# Patient Record
Sex: MALE | Race: WHITE
[De-identification: names, ages, dates, MRNs, and addresses within clinical notes are randomized per-mention and may not be internally consistent; named-entity substitution may affect disease eponyms.]

---

## 2018-04-01 ENCOUNTER — HOSPITAL ENCOUNTER (INPATIENT)
Dept: HOSPITAL 92 - ERS | Age: 73
LOS: 4 days | Discharge: HOME | DRG: 189 | End: 2018-04-05
Attending: INTERNAL MEDICINE | Admitting: INTERNAL MEDICINE
Payer: MEDICARE

## 2018-04-01 VITALS — BODY MASS INDEX: 24.1 KG/M2

## 2018-04-01 DIAGNOSIS — I10: ICD-10-CM

## 2018-04-01 DIAGNOSIS — J96.21: Primary | ICD-10-CM

## 2018-04-01 DIAGNOSIS — Z87.891: ICD-10-CM

## 2018-04-01 DIAGNOSIS — E78.5: ICD-10-CM

## 2018-04-01 DIAGNOSIS — Z95.5: ICD-10-CM

## 2018-04-01 DIAGNOSIS — E87.1: ICD-10-CM

## 2018-04-01 DIAGNOSIS — N40.0: ICD-10-CM

## 2018-04-01 DIAGNOSIS — Z99.81: ICD-10-CM

## 2018-04-01 DIAGNOSIS — I25.10: ICD-10-CM

## 2018-04-01 DIAGNOSIS — E87.6: ICD-10-CM

## 2018-04-01 DIAGNOSIS — J06.9: ICD-10-CM

## 2018-04-01 DIAGNOSIS — J44.1: ICD-10-CM

## 2018-04-01 LAB
ALBUMIN SERPL BCG-MCNC: 3.6 G/DL (ref 3.4–4.8)
ALP SERPL-CCNC: 73 U/L (ref 40–150)
ALT SERPL W P-5'-P-CCNC: 20 U/L (ref 8–55)
ANION GAP SERPL CALC-SCNC: 15 MMOL/L (ref 10–20)
AST SERPL-CCNC: 30 U/L (ref 5–34)
BASOPHILS # BLD AUTO: 0 THOU/UL (ref 0–0.2)
BASOPHILS NFR BLD AUTO: 0.1 % (ref 0–1)
BILIRUB SERPL-MCNC: 0.5 MG/DL (ref 0.2–1.2)
BUN SERPL-MCNC: 19 MG/DL (ref 8.4–25.7)
CALCIUM SERPL-MCNC: 9.2 MG/DL (ref 7.8–10.44)
CHLORIDE SERPL-SCNC: 94 MMOL/L (ref 98–107)
CK MB SERPL-MCNC: 3.3 NG/ML (ref 0–6.6)
CK SERPL-CCNC: 133 U/L (ref 30–200)
CO2 SERPL-SCNC: 26 MMOL/L (ref 23–31)
CREAT CL PREDICTED SERPL C-G-VRATE: 0 ML/MIN (ref 70–130)
CRYSTAL-AUWI FLAG: 0 (ref 0–15)
EOSINOPHIL # BLD AUTO: 0 THOU/UL (ref 0–0.7)
EOSINOPHIL NFR BLD AUTO: 0.2 % (ref 0–10)
GLOBULIN SER CALC-MCNC: 3.8 G/DL (ref 2.4–3.5)
GLUCOSE SERPL-MCNC: 126 MG/DL (ref 83–110)
HEV IGM SER QL: 2.1 (ref 0–7.99)
HGB BLD-MCNC: 12.5 G/DL (ref 14–18)
HYALINE CASTS #/AREA URNS LPF: (no result) LPF
LYMPHOCYTES # BLD: 0.8 THOU/UL (ref 1.2–3.4)
LYMPHOCYTES NFR BLD AUTO: 4.5 % (ref 21–51)
MCH RBC QN AUTO: 30.1 PG (ref 27–31)
MCV RBC AUTO: 92.1 FL (ref 80–94)
MONOCYTES # BLD AUTO: 1.6 THOU/UL (ref 0.11–0.59)
MONOCYTES NFR BLD AUTO: 9.4 % (ref 0–10)
NEUTROPHILS # BLD AUTO: 14.8 THOU/UL (ref 1.4–6.5)
NEUTROPHILS NFR BLD AUTO: 85.8 % (ref 42–75)
PATHC CAST-AUWI FLAG: 0 (ref 0–2.49)
PLATELET # BLD AUTO: 347 THOU/UL (ref 130–400)
POTASSIUM SERPL-SCNC: 4.7 MMOL/L (ref 3.5–5.1)
RBC # BLD AUTO: 4.16 MILL/UL (ref 4.7–6.1)
RBC UR QL AUTO: (no result) HPF (ref 0–3)
SODIUM SERPL-SCNC: 130 MMOL/L (ref 136–145)
SP GR UR STRIP: 1.01 (ref 1–1.04)
SPERM-AUWI FLAG: 0 (ref 0–9.9)
TROPONIN I SERPL DL<=0.01 NG/ML-MCNC: 0.02 NG/ML (ref ?–0.03)
TROPONIN I SERPL DL<=0.01 NG/ML-MCNC: 0.03 NG/ML (ref ?–0.03)
TROPONIN I SERPL DL<=0.01 NG/ML-MCNC: 0.03 NG/ML (ref ?–0.03)
WBC # BLD AUTO: 17.2 THOU/UL (ref 4.8–10.8)
WBC UR QL AUTO: (no result) HPF (ref 0–3)
YEAST-AUWI FLAG: 0 (ref 0–25)

## 2018-04-01 PROCEDURE — 96361 HYDRATE IV INFUSION ADD-ON: CPT

## 2018-04-01 PROCEDURE — 87633 RESP VIRUS 12-25 TARGETS: CPT

## 2018-04-01 PROCEDURE — 96366 THER/PROPH/DIAG IV INF ADDON: CPT

## 2018-04-01 PROCEDURE — 36415 COLL VENOUS BLD VENIPUNCTURE: CPT

## 2018-04-01 PROCEDURE — 96365 THER/PROPH/DIAG IV INF INIT: CPT

## 2018-04-01 PROCEDURE — 84484 ASSAY OF TROPONIN QUANT: CPT

## 2018-04-01 PROCEDURE — 83880 ASSAY OF NATRIURETIC PEPTIDE: CPT

## 2018-04-01 PROCEDURE — 96375 TX/PRO/DX INJ NEW DRUG ADDON: CPT

## 2018-04-01 PROCEDURE — 71045 X-RAY EXAM CHEST 1 VIEW: CPT

## 2018-04-01 PROCEDURE — A4216 STERILE WATER/SALINE, 10 ML: HCPCS

## 2018-04-01 PROCEDURE — 80048 BASIC METABOLIC PNL TOTAL CA: CPT

## 2018-04-01 PROCEDURE — 80053 COMPREHEN METABOLIC PANEL: CPT

## 2018-04-01 PROCEDURE — 94640 AIRWAY INHALATION TREATMENT: CPT

## 2018-04-01 PROCEDURE — 82553 CREATINE MB FRACTION: CPT

## 2018-04-01 PROCEDURE — 93005 ELECTROCARDIOGRAM TRACING: CPT

## 2018-04-01 PROCEDURE — 85025 COMPLETE CBC W/AUTO DIFF WBC: CPT

## 2018-04-01 PROCEDURE — 81001 URINALYSIS AUTO W/SCOPE: CPT

## 2018-04-01 PROCEDURE — 87798 DETECT AGENT NOS DNA AMP: CPT

## 2018-04-01 RX ADMIN — MOMETASONE FUROATE AND FORMOTEROL FUMARATE DIHYDRATE SCH PUFF: 200; 5 AEROSOL RESPIRATORY (INHALATION) at 18:50

## 2018-04-01 NOTE — HP
PRIMARY CARE PHYSICIAN:  Dr. Samson Brown.

 

PRIMARY PULMONOLOGIST:  Dr. Osborne.

 

REASON FOR ADMISSION:  COPD exacerbation.

 

HISTORY OF PRESENT ILLNESS:  A 73-year-old male who has underlying history of 
COPD who presented to emergency room with the complaint of increasing shortness 
of breath.  Patient reports that he is sick for the last 10 days.  He was 
having increasing shortness of breath, cough, and upper respiratory symptoms.  
Patient reports that at this time, he was exposed to pollen and after that 
allergy started.  He was using Claritin and Benadryl p.r.n. basis.  He was 
using his inhaler and nebulizer therapy at home, but symptoms were not 
improving.  Day by day, he was getting worse.  He was having fever of up to T-
maximum 101 at one time, but most of the days, his fever was running in 100 to 
99.  The patient was not able to get deep breath in and he was feeling more and 
more short of breath.  Symptoms were getting more and more worse during daytime 
as well as nighttime and that is why he decided to come to emergency room for 
evaluation.

 

Patient reports that he is normally able to ambulate without any getting 
shortness of breath, but for last 5 days he is having difficulty ambulation 
because of shortness of breath.  He denies any hemoptysis.  He denies any lower 
extremity edema.  He denies any calf tenderness.  He denies any chest pain.  He 
denies any hemoptysis.  He denies any nausea, vomiting, diarrhea, UTI symptoms.
  He denies any abdominal pain, hematochezia, or melena.

 

REVIEW OF SYSTEMS:  The following complete review of systems was negative, 
unless otherwise mentioned in the HPI or below:

Constitutional:  Weight loss or gain, ability to conduct usual activities.

Skin:  Rash, itching.

Eyes:  Double vision, pain.

ENT/Mouth:  Nose bleeding, neck stiffness, pain, tenderness.

Cardiovascular:  Palpitations, dyspnea on exertion, orthopnea.

Respiratory:  Shortness of breath, wheezing, cough, hemoptysis, fever or night 
sweats.

Gastrointestinal:  Poor appetite, abdominal pain, heartburn, nausea, vomiting, 
constipation, or diarrhea.

Genitourinary:  Urgency, frequency, dysuria, nocturia.

Musculoskeletal:  Pain, swelling.

Neurologic/Psychiatric:  Anxiety, depression.

Allergy/Immunologic:  Skin rash, bleeding tendency.

 

Please see my HPI for pertinent positive and negative.  All other review of 
systems reviewed and negative except as mentioned in the HPI.

 

ALLERGIES:  No known drug allergy.

 

CURRENT HOME MEDICATIONS:  ProAir HFA 2 puffs q.6 hourly p.r.n., aspirin 81 mg 
p.o. daily, Pulmicort nebulization twice daily, enalapril with 
hydrochlorothiazide 10/25 one tablet p.o. daily, formoterol 20 mcg nebulization 
b.i.d., DuoNeb q.6 hourly, Crestor 20 mg p.o. at bedtime, Flomax 0.4 mg p.o. 
daily.

 

PAST MEDICAL HISTORY:  Hypertension, COPD, chronic respiratory failure using 
oxygen on as needed basis, hypertension, colon cancer, history of hemorrhoid 
and coronary artery disease.

 

PAST SURGICAL HISTORY:  Cardiac catheterization with stent placement, colon 
cancer removed, hernia repair, bilateral cataract surgery.

 

PAST PSYCHIATRIC HISTORY:  Reviewed and negative.

 

SOCIAL HISTORY:  Patient is .  He is a former smoker.  He quit smoking 
several years ago.  He denies any alcohol abuse.  He denies any other illicit 
drug abuse.

 

FAMILY HISTORY:  Negative for GI malignancy.  Coronary artery disease runs 
among several family members.  No family history of stroke.

 

EMERGENCY ROOM COURSE:  Patient is given IV fluid, Solu-Medrol 125 mg, Levaquin 
750 mg, DuoNeb therapy.

 

PHYSICAL EXAMINATION:

VITAL SIGNS:  On arrival, blood pressure 159/66, pulse 104, respiratory rate 25
, temperature 99.1, saturation 99% and weight 72.5 kilograms.

GENERAL:  The patient is currently alert, awake, no obvious acute distress.

HEAD:  Normocephalic, atraumatic.

EYES:  Pupils round, reactive to light.  Extraocular muscle intact.

ENT:  Oropharynx within normal limits.  Moist mucous membranes.  No oral lesion
, no pharyngeal erythema, no exudate.

NECK:  Supple, no JVD, no thyromegaly, no carotid bruit, no meningeal signs of 
irritation.

LUNGS:  End expiratory wheezing heard.  No accessory muscles of respiration in 
use.  Air entry reduced on both sides.

CARDIAC:  S1 and S2 regular.  No murmur, no gallop, no rub.

ABDOMEN:  Soft, bowel sounds present, nontender, nondistended.  No organomegaly
, no mass, no suprapubic tenderness.

BACK:  Examination unremarkable, no CVA tenderness.

EXTREMITIES:  Upper extremity passive movements of all joints are normal.  
Lower extremities:  No edema.  Good peripheral pulsation.

SKIN:  No skin rash.

HEMATOLOGICAL SYSTEM:  No lymphadenopathy.

PSYCHIATRIC:  Normal affect.

 

IMAGING DATA AND SIGNIFICANT LABORATORY DATA:

1.  EKG showing sinus tachycardia, incomplete right bundle branch block pattern
, nonspecific ST-T changes.

2.  Chest x-ray based on my review, chronic lung changes, COPD type of changes, 
but no acute process.

3.  CBC:  WBC is 17.2, hemoglobin 12.5, platelets 347 with left shift.

4.  BMP:  Sodium 130, potassium 4.7, chloride 94, carbon dioxide 26, BUN 19, 
creatinine 1.11, glucose 126, calcium 9.2.

5.  LFT:  AST 30, ALT 20, alkaline phosphatase 73, albumin is 3.6.  , CK-
MB 3.3, troponin I 0.028, BNP 33.0.

 

ASSESSMENT AND PLAN/IMPRESSION:

1.  Acute on chronic obstructive pulmonary disease exacerbation.  The patient's 
chronic obstructive pulmonary disease flareup is caused by allergy/upper 
respiratory infection.  At this point, patient will be admitted to telemetry 
floor.  We will continue DuoNeb q.4 hourly and p.r.n. basis Solu-Medrol 40 mg 
IV q.6 hourly, Pulmicort nebulization twice daily and Dulera two puffs 
inhalation b.i.d.  We will also continue empiric antibiotic therapy with 
Levaquin 750 mg IV daily and Mucinex 600 mg 3 times daily.  We will also 
provide symptomatic treatment.  We will also continue oxygen to maintain 
saturation above 92%.

2.  Acute/chronic respiratory failure.  We will continue oxygen to maintain 
saturation above 92%.

3.  Dyslipidemia.  Continue Crestor 20 mg p.o. at bedtime.

4.  Benign enlargement of prostate.  Continue Flomax 0.4 mg p.o. daily.

5.  Hypertension.  Continue enalapril 10 mg daily and hydrochlorothiazide 25 mg 
p.o. daily.

6.  Hyponatremia likely related with hydrochlorothiazide versus chronic lung 
disease.  We will repeat BMP tomorrow.

7.  Leukocytosis, likely related with stress versus underlying infection.  The 
patient is already on antibiotic therapy and we will repeat CBC tomorrow.

8.  Deep venous thrombosis prophylaxis, Lovenox 40 mg subcu daily.

9.  Gastrointestinal prophylaxis, Protonix 40 mg p.o. daily.

 

CODE STATUS:  The patient is FULL CODE.  The patient's wife is surrogate 
decision maker.

 

Disposition plan based on clinical course.  We are expecting patient's stay in 
hospital more than 2 midnights.  Plan of care discussed with the patient in 
detail.

 

ESTEFANÍAD

## 2018-04-01 NOTE — RAD
CHEST 1 VIEW:

 

HISTORY: 

Dyspnea.

 

COMPARISON: 

Chest radiograph 11/18/16.

 

FINDINGS: 

Chronic-appearing markings in the lung bases.  No pneumothorax.  No acute osseous abnormality.

 

Cardiac silhouette and mediastinal contours are within normal limits.

 

IMPRESSION: 

Chronic lung changes.  No acute intrathoracic abnormality.

 

POS: [facility]

## 2018-04-02 LAB
ANION GAP SERPL CALC-SCNC: 9 MMOL/L (ref 10–20)
BASOPHILS # BLD AUTO: 0 THOU/UL (ref 0–0.2)
BASOPHILS NFR BLD AUTO: 0 % (ref 0–1)
BUN SERPL-MCNC: 21 MG/DL (ref 8.4–25.7)
CALCIUM SERPL-MCNC: 8.7 MG/DL (ref 7.8–10.44)
CHLORIDE SERPL-SCNC: 98 MMOL/L (ref 98–107)
CO2 SERPL-SCNC: 27 MMOL/L (ref 23–31)
CREAT CL PREDICTED SERPL C-G-VRATE: 66 ML/MIN (ref 70–130)
EOSINOPHIL # BLD AUTO: 0 THOU/UL (ref 0–0.7)
EOSINOPHIL NFR BLD AUTO: 0.2 % (ref 0–10)
GLUCOSE SERPL-MCNC: 159 MG/DL (ref 83–110)
HGB BLD-MCNC: 10.7 G/DL (ref 14–18)
LYMPHOCYTES # BLD: 0.6 THOU/UL (ref 1.2–3.4)
LYMPHOCYTES NFR BLD AUTO: 6.5 % (ref 21–51)
MCH RBC QN AUTO: 31.3 PG (ref 27–31)
MCV RBC AUTO: 91.6 FL (ref 80–94)
MONOCYTES # BLD AUTO: 0.4 THOU/UL (ref 0.11–0.59)
MONOCYTES NFR BLD AUTO: 4.2 % (ref 0–10)
NEUTROPHILS # BLD AUTO: 8.6 THOU/UL (ref 1.4–6.5)
NEUTROPHILS NFR BLD AUTO: 89.2 % (ref 42–75)
PLATELET # BLD AUTO: 332 THOU/UL (ref 130–400)
POTASSIUM SERPL-SCNC: 3.4 MMOL/L (ref 3.5–5.1)
RBC # BLD AUTO: 3.42 MILL/UL (ref 4.7–6.1)
SODIUM SERPL-SCNC: 131 MMOL/L (ref 136–145)
WBC # BLD AUTO: 9.6 THOU/UL (ref 4.8–10.8)

## 2018-04-02 RX ADMIN — MOMETASONE FUROATE AND FORMOTEROL FUMARATE DIHYDRATE SCH PUFF: 200; 5 AEROSOL RESPIRATORY (INHALATION) at 07:40

## 2018-04-02 RX ADMIN — MOMETASONE FUROATE AND FORMOTEROL FUMARATE DIHYDRATE SCH PUFF: 200; 5 AEROSOL RESPIRATORY (INHALATION) at 19:10

## 2018-04-02 NOTE — PDOC.PN
- Subjective


Encounter Start Date: 04/02/18


Encounter Start Time: 07:00


-: old records requested/rev





Pt has cough, dyspnea, slight better but still not normal for him, no fever





- Objective


Resuscitation Status: 


 











Resuscitation Status           FULL:Full Resuscitation














MAR Reviewed: Yes


Vital Signs & Weight: 


 Vital Signs (12 hours)











  Temp Pulse Resp BP BP Pulse Ox


 


 04/02/18 08:08     107/56 L  


 


 04/02/18 07:40   105 H  20    91 L


 


 04/02/18 07:37   105 H  20    91 L


 


 04/02/18 04:00  99 F  61  16   112/47 L  91 L


 


 04/02/18 02:57   88  12   


 


 04/01/18 23:00   88  12   








 Weight











Weight                         163 lb 8 oz














I&O: 


 











 04/01/18 04/02/18 04/03/18





 06:59 06:59 06:59


 


Intake Total  240 


 


Output Total  100 


 


Balance  140 











Result Diagrams: 


 04/02/18 05:24





 04/02/18 05:24





Phys Exam





- Physical Examination


Constitutional: NAD


HEENT: PERRLA, moist MMs, sclera anicteric


Neck: no JVD, supple


Respiratory: wheezing present


reduced air entry


Cardiovascular: RRR, no significant murmur, no rub


Gastrointestinal: soft, non-tender, no distention, positive bowel sounds


Musculoskeletal: no edema, pulses present


Neurological: non-focal, normal sensation, moves all 4 limbs


Lymphatic: no nodes


Psychiatric: normal affect, A&O x 3


Skin: no rash, normal turgor





Dx/Plan


(1) COPD exacerbation


Code(s): J44.1 - CHRONIC OBSTRUCTIVE PULMONARY DISEASE W (ACUTE) EXACERBATION   

Status: Acute   





(2) Hypokalemia


Code(s): E87.6 - HYPOKALEMIA   Status: Acute   





(3) Hyponatremia


Code(s): E87.1 - HYPO-OSMOLALITY AND HYPONATREMIA   Status: Acute   





(4) Leucocytosis


Code(s): D72.829 - ELEVATED WHITE BLOOD CELL COUNT, UNSPECIFIED   Status: Acute

   





(5) BPH (benign prostatic hyperplasia)


Code(s): N40.0 - BENIGN PROSTATIC HYPERPLASIA WITHOUT LOWER URINRY TRACT SYMP   

Status: Chronic   





(6) CAD (coronary artery disease)


Code(s): I25.10 - ATHSCL HEART DISEASE OF NATIVE CORONARY ARTERY W/O ANG PCTRS 

  Status: Chronic   





(7) Hypertension


Code(s): I10 - ESSENTIAL (PRIMARY) HYPERTENSION   Status: Chronic   





- Plan


cont current plan of care, continue antibiotics, respiratory therapy





* continue duoneb, pulmicort, solumedrol, levaquin, mucinex, 


* replace potassium


* pulmonary consulted


* medication reviewed as below


* symptomatic treatment


* home medication reconciled


* monitor oxygen level .








Review of Systems





- Review of Systems


Constitutional: negative: fever, chills, sweats, weakness, malaise, other


Eyes: negative: Pain, Vision Change, Conjunctivae Inflammation, Eyelid 

Inflammation, Redness, Other


Respiratory: Cough, Shortness of Breath, SOB with Excertion, Wheezing.  negative

: Dry, Hemoptysis, Pleuritic Pain, Sputum


Cardiovascular: negative: chest pain, palpitations, orthopnea, paroxysmal 

nocturnal dyspnea, edema, light headedness, other


Gastrointestinal: negative: Nausea, Vomiting, Abdominal Pain, Diarrhea, 

Constipation, Melena, Hematochezia, Other


Genitourinary: negative: Dysuria, Frequency, Incontinence, Hematuria, Retention

, Other


Musculoskeletal: negative: Neck Pain, Shoulder Pain, Arm Pain, Back Pain, Hand 

Pain, Leg Pain, Foot Pain, Other


Skin: negative: Rash, Lesions, [place], Bruising, Other





- Medications/Allergies


Allergies/Adverse Reactions: 


 Allergies











Allergy/AdvReac Type Severity Reaction Status Date / Time


 


No Known Drug Allergies Allergy   Verified 04/01/18 17:49











Medications: 


 Current Medications





Acetaminophen (Tylenol)  650 mg PO Q4H PRN


   PRN Reason: Headache/Fever or Pain


Hydrocodone Bitart/Acetaminophen (Norco 5/325)  1 tab PO Q4H PRN


   PRN Reason: Moderate Pain (4-6)


Al Hydroxide/Mg Hydroxide (Maalox)  30 ml PO Q6H PRN


   PRN Reason: Heartburn  or Indigestion


Albuterol/Ipratropium (Duoneb)  3 ml NEB [id number] [facility]


   Last Admin: 04/02/18 07:37 Dose:  3 ml


Albuterol/Ipratropium (Duoneb)  3 ml NEB [id number] PRN


   PRN Reason: SOB &/or Wheezing


Artificial Tears (Tears Naturale)  0 drop EA EYE PRN PRN


   PRN Reason: Dry Eyes


Aspirin (Aspirin Chewable)  81 mg PO DAILY [facility]


   Last Admin: 04/02/18 08:01 Dose:  81 mg


Budesonide (Pulmicort Neb Solution)  0.5 mg INH BID-RT [facility]


   Last Admin: 04/02/18 07:37 Dose:  0.5 mg


Clonidine (Catapres)  0.1 mg PO Q4H PRN


   PRN Reason: Systolic BP > 180


Enalapril Maleate (Vasotec)  10 mg PO DAILY [facility]


Enoxaparin Sodium (Lovenox)  40 mg SC 0900 [facility]


   Last Admin: 04/02/18 08:09 Dose:  Not Given


Finasteride (Proscar)  5 mg PO DAILY [facility]


Guaifenesin (Mucinex)  600 mg PO TID [facility]


   Last Admin: 04/02/18 08:01 Dose:  600 mg


Hydralazine HCl (Apresoline)  10 mg SLOW IVP Q4H PRN


   PRN Reason: Systolic BP > 180


Hydrochlorothiazide (Hydrochlorothiazide)  12.5 mg PO DAILY [facility]


   Last Admin: 04/02/18 08:09 Dose:  Not Given


Levofloxacin 750 mg/ Device  150 mls @ 100 mls/hr IVPB 1200 [facility]


Loperamide HCl (Imodium)  2 mg PO PRN PRN


   PRN Reason: Diarrhea/Loose Stools


Loratadine (Claritin)  10 mg PO DAILYPRN PRN


   PRN Reason: Sinus Symptoms


Magnesium Hydroxide (Milk Of Magnesium)  30 ml PO DAILYPRN PRN


   PRN Reason: Constipation


Methylprednisolone Sodium Succinate (Solu-Medrol)  40 mg IVP Q6HR [facility]


   Last Admin: 04/02/18 05:18 Dose:  40 mg


Mineral Oil/White Petrolatum (Eucerin Cream)  0 gm TOP BIDPRN PRN


   PRN Reason: Dry Skin


Mometasone Furoate/Formoterol Fumar (Dulera 200 Mcg/5 Mcg Inhaler)  2 puff INH 

BID-RT [facility]


   Last Admin: 04/02/18 07:40 Dose:  2 puff


Ondansetron HCl (Zofran Odt)  4 mg PO Q6H PRN


   PRN Reason: Nausea/Vomiting


Ondansetron HCl (Zofran)  4 mg IVP Q6H PRN


   PRN Reason: Nausea/Vomiting


Pantoprazole Sodium (Protonix)  40 mg PO DAILY [facility]


   Last Admin: 04/02/18 08:02 Dose:  40 mg


Phenol (Chloraseptic Spray 180 Ml Bot)  0 ml PO PRN PRN


   PRN Reason: Sore Throat


Rosuvastatin Calcium (Crestor)  20 mg PO [facility]


   Last Admin: 04/01/18 20:10 Dose:  Not Given


Senna (Senokot)  2 tab PO HSPRN PRN


   PRN Reason: Constipation


Sodium Chloride (Ocean Nasal Spray 0.65%)  0 ml EA NARE QIDPRN PRN


   PRN Reason: Nasal Congestion


Tamsulosin HCl (Flomax)  0.4 mg PO DAILY [facility]


   Last Admin: 04/02/18 08:01 Dose:  0.4 mg


Zolpidem Tartrate (Ambien)  5 mg PO HSPRN PRN


   PRN Reason: Insomnia

## 2018-04-03 RX ADMIN — MOMETASONE FUROATE AND FORMOTEROL FUMARATE DIHYDRATE SCH PUFF: 200; 5 AEROSOL RESPIRATORY (INHALATION) at 06:17

## 2018-04-03 NOTE — CON
DATE OF CONSULTATION:  04/02/2018

 

SERVICE:  Pulmonary Medicine.

 

REASON FOR CONSULTATION:  Chronic obstructive pulmonary disease exacerbation.

 

HISTORY OF PRESENT ILLNESS:  The patient is a 73-year-old white male with 
profound COPD.  He was in his usual state of health when he had onset of 
increasing congestion in the face.  Ultimately after a period of about a week, 
it went down into his chest.  He started having increasing dyspnea on exertion 
and got to the point where he could barely make it across the room.  He 
presented to the emergency department and was subsequently admitted for COPD 
exacerbation.  He denies any current fevers, chills, nausea or vomiting.  He 
did have some low grade fever.  He has not had any night sweats.  He is not 
coughing up any blood, but the character of his sputum is increased, and 
changed to green.

 

PHYSICAL EXAMINATION:

VITAL SIGNS:  Afebrile, pulse 83, blood pressure 107/56, respirations 18, 
saturation 91% on 2 liters nasal cannula.

GENERAL:  The patient is awake, alert, no apparent distress.

LUNGS:  There is decreased air entry.  He is not really moving enough air to 
appreciate adventitious sounds.

HEART:  Normal rate, regular.

ABDOMEN:  Soft, nontender, nondistended.  Bowel sounds are positive.

MUSCULOSKELETAL:  No cyanosis or clubbing.  There is no pitting in the 
bilateral lower extremities.

NEUROLOGIC:  Grossly nonfocal.

 

PAST MEDICAL HISTORY:

1.  COPD, profound.

2.  Chronic hypoxic respiratory failure, on home O2.

3.  Hypertension.

4.  Coronary artery disease.

5.  Dyslipidemia.

6.  History of colon cancer.

 

PAST SURGICAL HISTORY:

1.  Percutaneous coronary intervention.

2.  Hemicolectomy.

3.  Hernia repair.

4.  Cataract surgery, bilateral.

 

SOCIAL HISTORY:  He is .  He has over 50 pack year history of smoking, 
but quit several years ago.  He denies any alcohol or illicit drug use.  There 
is no exposure to chemicals, drugs, asbestosis or tuberculosis.

 

FAMILY HISTORY:  Noncontributory.

 

ALLERGIES:  No known drug allergies.

 

MEDICATIONS:  List of his inpatient medications was reviewed.  I made a couple 
of small updates at this time.

 

LABORATORY DATA:  WBC 9.6, hemoglobin 10.7, platelets 332,000.  Sodium 131 and 
up trending, potassium 3.4.  Basic metabolic profile is otherwise unremarkable.
  Cardiac enzymes are negative.  Liver function studies are normal, BNP 33.  
Urinalysis is unremarkable.

 

IMAGING:  Chest x-ray demonstrates hyperexpanded lung fields with evidence of 
COPD without acute cardiopulmonary abnormality.

 

ASSESSMENT:

1.  Acute on chronic hypoxic respiratory failure.

2.  Chronic obstructive pulmonary disease with acute exacerbation.

 

DISCUSSION AND PLAN:  I will check a respiratory virus panel.  The patient will 
also be initiated on Mucinex.  We will continue antibiotics, nebulized 
medications and steroids though I deescalate him to p.o. steroids.  Pulmonary 
and Critical Care will continue to follow.

 

70 minutes have been devoted to this patient in various activities.  I 
personally reviewed all imaging studies and laboratory data noted within this 
document.  For fifty percent of this time, I was interacting with the patient 
at the bedside or coordinating care with the care team.  For the remainder of 
the time I was immediately available to the patient in the hospital unit.  



NERY

## 2018-04-03 NOTE — PDOC.PN
- Subjective


Encounter Start Date: 04/03/18


Encounter Start Time: 11:30





Patient seen and examined. No new complaints. No overnight events


pt does not feel much improvement, still cough and dyspnea, no fever





- Objective


Resuscitation Status: 


 











Resuscitation Status           FULL:Full Resuscitation














MAR Reviewed: Yes


Vital Signs & Weight: 


 Vital Signs (12 hours)











  Temp Pulse Resp BP BP Pulse Ox


 


 04/03/18 11:46  97.5 F L  88  22 H   127/60  92 L


 


 04/03/18 10:01   86  18    95


 


 04/03/18 08:29     125/61  


 


 04/03/18 08:25  97.5 F L  86  18    93 L


 


 04/03/18 07:34  97.5 F L  93  20   125/61  93 L


 


 04/03/18 06:19   93  18    91 L


 


 04/03/18 06:18   93  18    91 L


 


 04/03/18 06:17   93  18    90 L


 


 04/03/18 03:40  98.1 F  83  16   124/60  97








 Weight











Weight                         163 lb 8 oz














I&O: 


 











 04/02/18 04/03/18 04/04/18





 06:59 06:59 06:59


 


Intake Total 240 720 


 


Output Total 100  325


 


Balance 140 720 -325











Result Diagrams: 


 04/02/18 05:24





 04/02/18 05:24





Phys Exam





- Physical Examination


Constitutional: NAD


HEENT: PERRLA, moist MMs, sclera anicteric


Neck: no JVD, supple


Respiratory: wheezing present


reduced air entry


Cardiovascular: RRR, no significant murmur, no rub


Gastrointestinal: soft, non-tender, no distention, positive bowel sounds


Musculoskeletal: no edema, pulses present


Neurological: non-focal, normal sensation, moves all 4 limbs


Lymphatic: no nodes


Psychiatric: normal affect, A&O x 3


Skin: no rash, normal turgor





Dx/Plan


(1) Acute on chronic respiratory failure with hypoxia


Code(s): J96.21 - ACUTE AND CHRONIC RESPIRATORY FAILURE WITH HYPOXIA   Status: 

Acute   





(2) COPD exacerbation


Code(s): J44.1 - CHRONIC OBSTRUCTIVE PULMONARY DISEASE W (ACUTE) EXACERBATION   

Status: Acute   





(3) Hypokalemia


Code(s): E87.6 - HYPOKALEMIA   Status: Acute   





(4) Hyponatremia


Code(s): E87.1 - HYPO-OSMOLALITY AND HYPONATREMIA   Status: Acute   





(5) Leucocytosis


Code(s): D72.829 - ELEVATED WHITE BLOOD CELL COUNT, UNSPECIFIED   Status: Acute

   





(6) BPH (benign prostatic hyperplasia)


Code(s): N40.0 - BENIGN PROSTATIC HYPERPLASIA WITHOUT LOWER URINRY TRACT SYMP   

Status: Chronic   





(7) CAD (coronary artery disease)


Code(s): I25.10 - ATHSCL HEART DISEASE OF NATIVE CORONARY ARTERY W/O ANG PCTRS 

  Status: Chronic   





(8) Hypertension


Code(s): I10 - ESSENTIAL (PRIMARY) HYPERTENSION   Status: Chronic   





- Plan


cont current plan of care, continue antibiotics, respiratory therapy





* continue current optimum medical therapy for COPD


* not ready for discharge


* he needs more time to recover


* medication reviewed as below


* symptomatic treatment.








Review of Systems





- Review of Systems


Constitutional: negative: fever, chills, sweats, weakness, malaise, other


Respiratory: Cough, Shortness of Breath, SOB with Excertion, Sputum, Wheezing.  

negative: Dry, Hemoptysis, Pleuritic Pain


Cardiovascular: negative: chest pain, palpitations, orthopnea, paroxysmal 

nocturnal dyspnea, edema, light headedness, other


Gastrointestinal: negative: Nausea, Vomiting, Abdominal Pain, Diarrhea, 

Constipation, Melena, Hematochezia, Other


Genitourinary: negative: Dysuria, Frequency, Incontinence, Hematuria, Retention

, Other


Musculoskeletal: negative: Neck Pain, Shoulder Pain, Arm Pain, Back Pain, Hand 

Pain, Leg Pain, Foot Pain, Other


Skin: negative: Rash, Lesions, [place], Bruising, Other





- Medications/Allergies


Allergies/Adverse Reactions: 


 Allergies











Allergy/AdvReac Type Severity Reaction Status Date / Time


 


No Known Drug Allergies Allergy   Verified 04/01/18 17:49











Medications: 


 Current Medications





Acetaminophen (Tylenol)  650 mg PO Q4H PRN


   PRN Reason: Headache/Fever or Pain


Hydrocodone Bitart/Acetaminophen (Norco 5/325)  1 tab PO Q4H PRN


   PRN Reason: Moderate Pain (4-6)


Al Hydroxide/Mg Hydroxide (Maalox)  30 ml PO Q6H PRN


   PRN Reason: Heartburn  or Indigestion


Albuterol/Ipratropium (Duoneb)  3 ml NEB [id number]


   Last Admin: 04/03/18 10:01 Dose:  3 ml


Albuterol/Ipratropium (Duoneb)  3 ml NEB [id number] PRN


   PRN Reason: SOB &/or Wheezing


Artificial Tears (Tears Naturale)  0 drop EA EYE PRN PRN


   PRN Reason: Dry Eyes


Aspirin (Aspirin Chewable)  81 mg PO DAILY [facility]


   Last Admin: 04/03/18 08:28 Dose:  81 mg


Clonidine (Catapres)  0.1 mg PO Q4H PRN


   PRN Reason: Systolic BP > 180


Enalapril Maleate (Vasotec)  10 mg PO DAILY [facility]


   Last Admin: 04/03/18 08:29 Dose:  10 mg


Enoxaparin Sodium (Lovenox)  40 mg SC 0900 [facility]


   Last Admin: 04/03/18 08:28 Dose:  Not Given


Finasteride (Proscar)  5 mg PO 1300 [facility]


   Last Admin: 04/03/18 12:42 Dose:  5 mg


Guaifenesin (Mucinex)  1,200 mg PO Q12HR [facility]


   Last Admin: 04/03/18 08:27 Dose:  1,200 mg


Hydralazine HCl (Apresoline)  10 mg SLOW IVP Q4H PRN


   PRN Reason: Systolic BP > 180


Hydrochlorothiazide (Hydrochlorothiazide)  12.5 mg PO DAILY [facility]


   Last Admin: 04/03/18 08:29 Dose:  Not Given


Levofloxacin 750 mg/ Device  150 mls @ 100 mls/hr IVPB 1200 [facility]


   Last Admin: 04/03/18 12:42 Dose:  150 mls


Loperamide HCl (Imodium)  2 mg PO PRN PRN


   PRN Reason: Diarrhea/Loose Stools


Loratadine (Claritin)  10 mg PO DAILYPRN PRN


   PRN Reason: Sinus Symptoms


Magnesium Hydroxide (Milk Of Magnesium)  30 ml PO DAILYPRN PRN


   PRN Reason: Constipation


Mineral Oil/White Petrolatum (Eucerin Cream)  0 gm TOP BIDPRN PRN


   PRN Reason: Dry Skin


Ondansetron HCl (Zofran Odt)  4 mg PO Q6H PRN


   PRN Reason: Nausea/Vomiting


Ondansetron HCl (Zofran)  4 mg IVP Q6H PRN


   PRN Reason: Nausea/Vomiting


Pantoprazole Sodium (Protonix)  40 mg PO DAILY [facility]


   Last Admin: 04/03/18 08:28 Dose:  40 mg


Phenol (Chloraseptic Spray 180 Ml Bot)  0 ml PO PRN PRN


   PRN Reason: Sore Throat


Prednisone (Prednisone)  40 mg PO [facility]


   Stop: 04/07/18 08:01


   Last Admin: 04/03/18 08:26 Dose:  40 mg


Rosuvastatin Calcium (Crestor)  20 mg PO [facility]


   Last Admin: 04/02/18 17:47 Dose:  20 mg


Senna (Senokot)  2 tab PO HSPRN PRN


   PRN Reason: Constipation


Sodium Chloride (Ocean Nasal Spray 0.65%)  0 ml EA NARE QIDPRN PRN


   PRN Reason: Nasal Congestion


Tamsulosin HCl (Flomax)  0.4 mg PO DAILY [facility]


   Last Admin: 04/03/18 08:32 Dose:  0.4 mg


Zolpidem Tartrate (Ambien)  5 mg PO HSPRN PRN


   PRN Reason: Insomnia

## 2018-04-03 NOTE — PRG
DATE OF SERVICE:  04/03/2018

 

SERVICE:  Pulmonary Medicine

 

INTERVAL HISTORY:  The patient is doing great from a respiratory standpoint.  
He had a rough night last night, but this morning he coughed up a couple of big 
globs of sputum.  He felt much better after this.  He denies any current chest 
pain, nausea, vomiting, fevers or chills.

 

PHYSICAL EXAMINATION:

VITAL SIGNS:  Afebrile, pulse 93, blood pressure 125/61, respirations 20, 
saturation 93% on 2-1/2 liters nasal cannula.

GENERAL:  The patient is awake, alert, in no apparent distress.

LUNGS:  Slight improvement in air entry today.  There is a prolonged expiratory 
phase with wheezing present.

HEART:  Normal rate, regular.

ABDOMEN:  Soft, nontender, nondistended.  Bowel sounds are positive.

MUSCULOSKELETAL:  No cyanosis or clubbing.  There is no pitting in the 
bilateral lower extremities.

NEUROLOGIC:  Grossly nonfocal.

 

ASSESSMENT:

1.  Acute on chronic hypoxic respiratory failure.

2.  Chronic obstructive pulmonary disease with acute exacerbation, improving.  

 

DISCUSSION AND PLAN:  The respiratory virus panel is currently pending.  From 
my perspective, he is stable for transition out of the hospital when he feels 
comfortable leaving.  Until then, we will continue our nebulized medications, 
steroids and antibiotics.  On discharge from the hospital, he will need to 
continue his home regimen.  Symbicort, does not help Mr. Ambriz as he does not 
have the lung capacity to get this medication adequately.  As such, the Dulera 
will be discontinued.

 

MTDD

## 2018-04-04 NOTE — PRG
DATE OF SERVICE:  04/04/2018

 

SERVICE:  Pulmonary Medicine.

 

INTERVAL HISTORY:  The patient is doing outstanding from a respiratory standpoint.  He is making good
 headway.  He still bringing up a little bit of sputum.  His breathing is much better, but he has not
 been very mobile about his room.  He is working on increasing that as tolerated.

 

PHYSICAL EXAMINATION:

VITAL SIGNS:  Afebrile, pulse 80, blood pressure 124/66, respirations 16, saturation 95% on 1-1/2 lit
ers nasal cannula.

GENERAL:  The patient is awake and alert, no apparent distress.

LUNGS:  Better air entry, but still decreased.  There is a prolonged expiratory phase.  Rhonchi and w
heezing are both present.

HEART:  Normal rate, regular.

ABDOMEN:  Soft, nontender, nondistended.  Bowel sounds are positive.

MUSCULOSKELETAL:  No cyanosis or clubbing.  There is no pitting in the bilateral lower extremities.

NEUROLOGIC:  Grossly nonfocal.

 

ASSESSMENT:

1.  Acute on chronic hypoxic respiratory failure.

2.  Chronic obstructive pulmonary disease with acute exacerbation, improving.

 

PLAN:  We will continue our antibiotics, nebulized medications and steroids.  We will also continue h
is Mucinex to help liberate some sputum.  From my perspective, he is stable for transition home today
 or tomorrow.  He would prefer tomorrow.  He is going to work on walking a touch more today.  If he i
s doing better tomorrow morning, he should be discharged from the hospital.  I will continue to follo
w as long as he remains in-house.

## 2018-04-04 NOTE — PDOC.PN
- Subjective


Encounter Start Date: 04/04/18


Encounter Start Time: 06:15





Patient seen and examined. No new complaints. No overnight events





- Objective


Resuscitation Status: 


 











Resuscitation Status           FULL:Full Resuscitation














MAR Reviewed: Yes


Vital Signs & Weight: 


 Vital Signs (12 hours)











  Temp Pulse Resp BP BP Pulse Ox


 


 04/04/18 08:01     134/66  


 


 04/04/18 07:15  98.1 F  84  20    94 L


 


 04/04/18 07:02  98.1 F  84  20   134/66  94 L


 


 04/04/18 06:11   78  16    96


 


 04/04/18 03:55  98.9 F  75  20   141/67 H  96


 


 04/04/18 01:44   96  18    90 L


 


 04/04/18 01:38       94 L


 


 04/04/18 00:00  98.2 F  77  20   128/62  95


 


 04/03/18 23:31   75  18    94 L








 Weight











Weight                         163 lb 8 oz














I&O: 


 











 04/03/18 04/04/18 04/05/18





 06:59 06:59 06:59


 


Intake Total 720  


 


Output Total  850 


 


Balance 720 -850 











Result Diagrams: 


 04/02/18 05:24





 04/02/18 05:24





Phys Exam





- Physical Examination


Constitutional: NAD


HEENT: PERRLA, moist MMs, sclera anicteric


Neck: no JVD, supple


Respiratory: no wheezing, no rales, no rhonchi


air entry improving


Cardiovascular: RRR, no significant murmur, no rub


Gastrointestinal: soft, non-tender, no distention, positive bowel sounds


Musculoskeletal: no edema, pulses present


Neurological: non-focal, normal sensation, moves all 4 limbs


Psychiatric: normal affect, A&O x 3


Skin: no rash, normal turgor





Dx/Plan


(1) Acute on chronic respiratory failure with hypoxia


Code(s): J96.21 - ACUTE AND CHRONIC RESPIRATORY FAILURE WITH HYPOXIA   Status: 

Acute   





(2) COPD exacerbation


Code(s): J44.1 - CHRONIC OBSTRUCTIVE PULMONARY DISEASE W (ACUTE) EXACERBATION   

Status: Acute   





(3) Hypokalemia


Code(s): E87.6 - HYPOKALEMIA   Status: Acute   





(4) Hyponatremia


Code(s): E87.1 - HYPO-OSMOLALITY AND HYPONATREMIA   Status: Acute   





(5) Leucocytosis


Code(s): D72.829 - ELEVATED WHITE BLOOD CELL COUNT, UNSPECIFIED   Status: Acute

   





(6) BPH (benign prostatic hyperplasia)


Code(s): N40.0 - BENIGN PROSTATIC HYPERPLASIA WITHOUT LOWER URINRY TRACT SYMP   

Status: Chronic   





(7) CAD (coronary artery disease)


Code(s): I25.10 - ATHSCL HEART DISEASE OF NATIVE CORONARY ARTERY W/O ANG PCTRS 

  Status: Chronic   





(8) Hypertension


Code(s): I10 - ESSENTIAL (PRIMARY) HYPERTENSION   Status: Chronic   





- Plan


cont current plan of care, continue antibiotics, respiratory therapy





* continue po prednisone


* medication reviewed as below


* symptomatic treatment


* continue respiratory therapy


* today will ambulate more and see how he does


* expecting discharge tomorrow, pt prefers that way as well.








Review of Systems





- Review of Systems


Constitutional: negative: fever, chills, sweats, weakness, malaise, other


Eyes: negative: Pain, Vision Change, Conjunctivae Inflammation, Eyelid 

Inflammation, Redness, Other


Respiratory: Cough, SOB with Excertion.  negative: Dry, Shortness of Breath, 

Hemoptysis, Pleuritic Pain, Sputum, Wheezing


Cardiovascular: negative: chest pain, palpitations, orthopnea, paroxysmal 

nocturnal dyspnea, edema, light headedness, other


Gastrointestinal: negative: Nausea, Vomiting, Abdominal Pain, Diarrhea, 

Constipation, Melena, Hematochezia, Other


Genitourinary: negative: Dysuria, Frequency, Incontinence, Hematuria, Retention

, Other


Musculoskeletal: negative: Neck Pain, Shoulder Pain, Arm Pain, Back Pain, Hand 

Pain, Leg Pain, Foot Pain, Other


Skin: negative: Rash, Lesions, [place], Bruising, Other





- Medications/Allergies


Allergies/Adverse Reactions: 


 Allergies











Allergy/AdvReac Type Severity Reaction Status Date / Time


 


No Known Drug Allergies Allergy   Verified 04/01/18 17:49











Medications: 


 Current Medications





Acetaminophen (Tylenol)  650 mg PO Q4H PRN


   PRN Reason: Headache/Fever or Pain


Hydrocodone Bitart/Acetaminophen (Norco 5/325)  1 tab PO Q4H PRN


   PRN Reason: Moderate Pain (4-6)


Al Hydroxide/Mg Hydroxide (Maalox)  30 ml PO Q6H PRN


   PRN Reason: Heartburn  or Indigestion


Albuterol/Ipratropium (Duoneb)  3 ml NEB [id number]


   Last Admin: 04/04/18 06:11 Dose:  3 ml


Albuterol/Ipratropium (Duoneb)  3 ml NEB [id number] PRN


   PRN Reason: SOB &/or Wheezing


Artificial Tears (Tears Naturale)  0 drop EA EYE PRN PRN


   PRN Reason: Dry Eyes


Aspirin (Aspirin Chewable)  81 mg PO DAILY [facility]


   Last Admin: 04/04/18 08:01 Dose:  81 mg


Clonidine (Catapres)  0.1 mg PO Q4H PRN


   PRN Reason: Systolic BP > 180


Enalapril Maleate (Vasotec)  10 mg PO DAILY [facility]


   Last Admin: 04/04/18 08:01 Dose:  10 mg


Enoxaparin Sodium (Lovenox)  40 mg SC 0900 [facility]


   Last Admin: 04/04/18 08:02 Dose:  Not Given


Finasteride (Proscar)  5 mg PO 1300 [facility]


   Last Admin: 04/03/18 12:42 Dose:  5 mg


Guaifenesin (Mucinex)  1,200 mg PO Q12HR [facility]


   Last Admin: 04/04/18 08:01 Dose:  1,200 mg


Hydralazine HCl (Apresoline)  10 mg SLOW IVP Q4H PRN


   PRN Reason: Systolic BP > 180


Hydrochlorothiazide (Hydrochlorothiazide)  12.5 mg PO DAILY [facility]


   Last Admin: 04/04/18 08:02 Dose:  Not Given


Levofloxacin 750 mg/ Device  150 mls @ 100 mls/hr IVPB 1200 [facility]


   Last Admin: 04/03/18 12:42 Dose:  150 mls


Loperamide HCl (Imodium)  2 mg PO PRN PRN


   PRN Reason: Diarrhea/Loose Stools


Loratadine (Claritin)  10 mg PO DAILYPRN PRN


   PRN Reason: Sinus Symptoms


Magnesium Hydroxide (Milk Of Magnesium)  30 ml PO DAILYPRN PRN


   PRN Reason: Constipation


Mineral Oil/White Petrolatum (Eucerin Cream)  0 gm TOP BIDPRN PRN


   PRN Reason: Dry Skin


Ondansetron HCl (Zofran Odt)  4 mg PO Q6H PRN


   PRN Reason: Nausea/Vomiting


Ondansetron HCl (Zofran)  4 mg IVP Q6H PRN


   PRN Reason: Nausea/Vomiting


Pantoprazole Sodium (Protonix)  40 mg PO DAILY [facility]


   Last Admin: 04/04/18 08:00 Dose:  40 mg


Phenol (Chloraseptic Spray 180 Ml Bot)  0 ml PO PRN PRN


   PRN Reason: Sore Throat


Prednisone (Prednisone)  40 mg PO QAM-WM [facility]


   Stop: 04/07/18 08:01


   Last Admin: 04/04/18 08:00 Dose:  40 mg


Rosuvastatin Calcium (Crestor)  20 mg PO [facility]


   Last Admin: 04/03/18 20:00 Dose:  20 mg


Senna (Senokot)  2 tab PO HSPRN PRN


   PRN Reason: Constipation


Sodium Chloride (Ocean Nasal Spray 0.65%)  0 ml EA NARE QIDPRN PRN


   PRN Reason: Nasal Congestion


Tamsulosin HCl (Flomax)  0.4 mg PO DAILY [facility]


   Last Admin: 04/04/18 08:02 Dose:  0.4 mg


Zolpidem Tartrate (Ambien)  5 mg PO HSPRN PRN


   PRN Reason: Insomnia

## 2018-04-05 VITALS — TEMPERATURE: 97.9 F

## 2018-04-05 VITALS — DIASTOLIC BLOOD PRESSURE: 55 MMHG | SYSTOLIC BLOOD PRESSURE: 109 MMHG

## 2018-04-05 NOTE — DIS
DATE OF ADMISSION:  04/01/2018

 

DATE OF DISCHARGE:  04/05/2018

 

PRIMARY CARE PHYSICIAN:  Dr. Samson Brown.

 

DISCHARGE DISPOSITION:  Home.

 

PRIMARY DISCHARGE DIAGNOSES:

1.  Acute on chronic respiratory failure with hypoxia.

2.  Chronic obstructive pulmonary disease exacerbation.

3.  Hypokalemia, corrected.

4.  Hyponatremia, corrected.

5.  Leukocytosis, resolved.

 

SECONDARY DISCHARGE DIAGNOSES:  Benign enlargement of prostate, coronary artery disease, hypertension
, chronic obstructive pulmonary disease, chronic respiratory failure on home oxygen.

 

PRIMARY PROCEDURES/OPERATIONS:  None.

 

RADIOLOGICAL INVESTIGATION:  Chest x-ray showed chronic changes.

 

SIGNIFICANT LABORATORY DATA:  Hemoglobin 10.7, creatinine 1.01.  LFT normal.  Cardiac enzymes negativ
e.  BNP 33.  Urinalysis normal.  Respiratory virus panel negative.

 

DISCHARGE MEDICATIONS:  Ventolin nebulization q.6 hourly p.r.n.; aspirin 81 mg p.o. daily; Pulmicort 
nebulization twice daily; enalapril with hydrochlorothiazide 10/25 one tablet p.o. daily; Proscar 5 m
g p.o. daily; Perforomist 20 mcg nebulization b.i.d.; Mucinex 600 mg p.o. q.6 hourly for 7 days; Leva
abel 750 mg p.o. daily for 5 days; Protonix 40 mg p.o. daily; prednisone 40 mg p.o. daily for 5 days,
 then 20 mg p.o. daily for 5 days, then 10 mg p.o. daily for 5 days; Crestor 20 mg p.o. at bedtime; F
kirby 0.4 mg p.o. daily.

 

CONTRAINDICATIONS:  None.

 

CODE STATUS:  FULL CODE.

 

INPATIENT CONSULTANTS:  Dr. Osborne, pulmonologist, was following while in hospital.

 

TEST RESULTS PENDING ON DISCHARGE:  None.

 

ALLERGIES:  No known drug allergy.

 

DISCHARGE PLAN:  Post hospital, the patient will follow up with Dr. Osborne as instructed.  The patie
nt will follow up with primary care physician.

 

HOSPITAL COURSE:  A 73-year-old male who was suffering from allergy symptoms and upper respiratory sy
mptoms secondary to weather change and subsequently he was having increasing shortness of breath.  He
 was trying his home medication, but he was not improving and his condition was getting worse and annika
t is why he came to emergency room on 04/01/2018 and I admitted in hospital.  Please see my HPI for f
urther detail.  The patient was admitted and he was treated with Solu-Medrol, empiric antibiotic ther
apy with Levaquin and frequent DuoNeb and Pulmicort nebulization therapy.  Pulmonologist was consulte
d while in hospital.  With optimum COPD treatment, the patient's condition improved to his baseline s
tatus.  He has chronic respiratory failure and he was having hypoxic respiratory failure on top of th
at which was corrected with the COPD treatment.

 

Now, the patient is able to ambulate well and he is maintaining saturation well with his routine use 
of oxygen and is tolerating p.o. well, able to talk in full sentences and he is feeling by himself up
 to his baseline.

 

Pulmonologist okay with discharging him today.  The patient also okay to go home today.  The patient 
is seen and examined at bedside today.  His examination is completely unremarkable other than the sabino
g examination showing a slightly reduced air entry, but no wheezing, no rhonchi, no accessory muscles
 of respiration in use.  His vitals are stable.  The patient will continue all his previous medicatio
n.  All new medication prescriptions sent to his pharmacy.

## 2018-04-24 ENCOUNTER — HOSPITAL ENCOUNTER (EMERGENCY)
Dept: HOSPITAL 92 - SCSER | Age: 73
LOS: 1 days | Discharge: HOME | End: 2018-04-25
Payer: MEDICARE

## 2018-04-24 DIAGNOSIS — N39.0: Primary | ICD-10-CM

## 2018-04-24 DIAGNOSIS — E87.1: ICD-10-CM

## 2018-04-24 DIAGNOSIS — J44.9: ICD-10-CM

## 2018-04-24 DIAGNOSIS — Z87.891: ICD-10-CM

## 2018-04-24 DIAGNOSIS — I10: ICD-10-CM

## 2018-04-24 LAB
BACTERIA UR QL AUTO: (no result) HPF
CRYSTALS URNS QL MICRO: (no result) HPF
HYALINE CASTS #/AREA URNS LPF: (no result) LPF
PROT UR STRIP.AUTO-MCNC: 100 MG/DL
SP GR UR STRIP: 1.02 (ref 1–1.03)

## 2018-04-24 PROCEDURE — 81015 MICROSCOPIC EXAM OF URINE: CPT

## 2018-04-24 PROCEDURE — 87086 URINE CULTURE/COLONY COUNT: CPT

## 2018-04-24 PROCEDURE — 84300 ASSAY OF URINE SODIUM: CPT

## 2018-04-24 PROCEDURE — 81003 URINALYSIS AUTO W/O SCOPE: CPT

## 2018-04-24 PROCEDURE — 96372 THER/PROPH/DIAG INJ SC/IM: CPT

## 2018-04-24 PROCEDURE — 51701 INSERT BLADDER CATHETER: CPT

## 2018-04-24 PROCEDURE — 83935 ASSAY OF URINE OSMOLALITY: CPT

## 2018-04-24 PROCEDURE — 80048 BASIC METABOLIC PNL TOTAL CA: CPT

## 2018-04-24 PROCEDURE — 36415 COLL VENOUS BLD VENIPUNCTURE: CPT

## 2018-10-02 ENCOUNTER — HOSPITAL ENCOUNTER (OUTPATIENT)
Dept: HOSPITAL 92 - CT | Age: 73
Discharge: HOME | End: 2018-10-02
Attending: FAMILY MEDICINE
Payer: MEDICARE

## 2018-10-02 DIAGNOSIS — Z12.2: Primary | ICD-10-CM

## 2018-10-02 DIAGNOSIS — I25.10: ICD-10-CM

## 2018-10-02 DIAGNOSIS — Z00.00: ICD-10-CM

## 2018-10-02 PROCEDURE — G0297 LDCT FOR LUNG CA SCREEN: HCPCS

## 2018-10-02 NOTE — CT
LOW DOSE CT PULMONARY LUNG SCAN PERFORMED WITHOUT CONTRAST ENHANCEMENT: 

10/2/18

 

HISTORY: 

40+ year history of smoking. Patient quit approximately 15 years ago. This is done as a low dose scre
ening study. 

 

Lungs show emphysematous lung changes predominantly involving the upper lobes. There is parenchymal s
carring within the left upper lobe. There is a calcified granuloma in the left base. No additional pu
lmonary nodules are seen. No pleural effusions identified. 

 

No significant mediastinal adenopathy is noted. There is calcification at the level of the aortic ana
ve and fairly extensive coronary calcification seen. The visualized liver parenchyma is unremarkable.
 Hypodensity within the right kidney is incompletely visualized but appeared to represent a cyst on a
 previous 2016 CT study. 

 

IMPRESSION:  

Lung RADS category 1, modifier S. The modifier being applied for the presence of aortic valve and laurie
rly extensive coronary artery calcifications. The lung screening is recommended as a one year annual 
followup low does screening exam. 

 

POS: [facility]

## 2019-04-05 ENCOUNTER — HOSPITAL ENCOUNTER (OUTPATIENT)
Dept: HOSPITAL 92 - ERS | Age: 74
Setting detail: OBSERVATION
LOS: 1 days | Discharge: HOME | End: 2019-04-06
Attending: HOSPITALIST | Admitting: HOSPITALIST
Payer: MEDICARE

## 2019-04-05 VITALS — BODY MASS INDEX: 24.3 KG/M2

## 2019-04-05 DIAGNOSIS — Z85.038: ICD-10-CM

## 2019-04-05 DIAGNOSIS — Z79.82: ICD-10-CM

## 2019-04-05 DIAGNOSIS — I25.10: ICD-10-CM

## 2019-04-05 DIAGNOSIS — Z79.899: ICD-10-CM

## 2019-04-05 DIAGNOSIS — Z95.5: ICD-10-CM

## 2019-04-05 DIAGNOSIS — Z79.02: ICD-10-CM

## 2019-04-05 DIAGNOSIS — J44.9: ICD-10-CM

## 2019-04-05 DIAGNOSIS — I10: ICD-10-CM

## 2019-04-05 DIAGNOSIS — N40.0: ICD-10-CM

## 2019-04-05 DIAGNOSIS — I65.23: Primary | ICD-10-CM

## 2019-04-05 DIAGNOSIS — Z87.891: ICD-10-CM

## 2019-04-05 DIAGNOSIS — G40.109: ICD-10-CM

## 2019-04-05 DIAGNOSIS — Z98.890: ICD-10-CM

## 2019-04-05 LAB
ALBUMIN SERPL BCG-MCNC: 4.1 G/DL (ref 3.4–4.8)
ALP SERPL-CCNC: 71 U/L (ref 40–150)
ALT SERPL W P-5'-P-CCNC: 23 U/L (ref 8–55)
ANION GAP SERPL CALC-SCNC: 9 MMOL/L (ref 10–20)
APTT PPP: 26.4 SEC (ref 22.9–36.1)
AST SERPL-CCNC: 21 U/L (ref 5–34)
BASOPHILS # BLD AUTO: 0.1 THOU/UL (ref 0–0.2)
BASOPHILS NFR BLD AUTO: 1.3 % (ref 0–1)
BILIRUB SERPL-MCNC: 0.4 MG/DL (ref 0.2–1.2)
BUN SERPL-MCNC: 18 MG/DL (ref 8.4–25.7)
CALCIUM SERPL-MCNC: 9.2 MG/DL (ref 7.8–10.44)
CHLORIDE SERPL-SCNC: 99 MMOL/L (ref 98–107)
CK MB SERPL-MCNC: 2.5 NG/ML (ref 0–6.6)
CO2 SERPL-SCNC: 30 MMOL/L (ref 23–31)
CREAT CL PREDICTED SERPL C-G-VRATE: 0 ML/MIN (ref 70–130)
EOSINOPHIL # BLD AUTO: 0.2 THOU/UL (ref 0–0.7)
EOSINOPHIL NFR BLD AUTO: 3 % (ref 0–10)
GLOBULIN SER CALC-MCNC: 2.7 G/DL (ref 2.4–3.5)
GLUCOSE SERPL-MCNC: 110 MG/DL (ref 83–110)
HGB BLD-MCNC: 12.4 G/DL (ref 14–18)
INR PPP: 1
LYMPHOCYTES # BLD: 1.2 THOU/UL (ref 1.2–3.4)
LYMPHOCYTES NFR BLD AUTO: 17.4 % (ref 21–51)
MCH RBC QN AUTO: 31.2 PG (ref 27–31)
MCV RBC AUTO: 94.3 FL (ref 78–98)
MONOCYTES # BLD AUTO: 0.8 THOU/UL (ref 0.11–0.59)
MONOCYTES NFR BLD AUTO: 10.9 % (ref 0–10)
NEUTROPHILS # BLD AUTO: 4.7 THOU/UL (ref 1.4–6.5)
NEUTROPHILS NFR BLD AUTO: 67.4 % (ref 42–75)
PLATELET # BLD AUTO: 263 THOU/UL (ref 130–400)
POTASSIUM SERPL-SCNC: 4.6 MMOL/L (ref 3.5–5.1)
PROTHROMBIN TIME: 13.7 SEC (ref 12–14.7)
RBC # BLD AUTO: 3.98 MILL/UL (ref 4.7–6.1)
SODIUM SERPL-SCNC: 133 MMOL/L (ref 136–145)
TROPONIN I SERPL DL<=0.01 NG/ML-MCNC: 0.01 NG/ML (ref ?–0.03)
WBC # BLD AUTO: 7 THOU/UL (ref 4.8–10.8)

## 2019-04-05 PROCEDURE — 80061 LIPID PANEL: CPT

## 2019-04-05 PROCEDURE — 93880 EXTRACRANIAL BILAT STUDY: CPT

## 2019-04-05 PROCEDURE — 94760 N-INVAS EAR/PLS OXIMETRY 1: CPT

## 2019-04-05 PROCEDURE — 99285 EMERGENCY DEPT VISIT HI MDM: CPT

## 2019-04-05 PROCEDURE — 93005 ELECTROCARDIOGRAM TRACING: CPT

## 2019-04-05 PROCEDURE — 85025 COMPLETE CBC W/AUTO DIFF WBC: CPT

## 2019-04-05 PROCEDURE — G0378 HOSPITAL OBSERVATION PER HR: HCPCS

## 2019-04-05 PROCEDURE — 84484 ASSAY OF TROPONIN QUANT: CPT

## 2019-04-05 PROCEDURE — 80053 COMPREHEN METABOLIC PANEL: CPT

## 2019-04-05 PROCEDURE — 85730 THROMBOPLASTIN TIME PARTIAL: CPT

## 2019-04-05 PROCEDURE — 36415 COLL VENOUS BLD VENIPUNCTURE: CPT

## 2019-04-05 PROCEDURE — 36416 COLLJ CAPILLARY BLOOD SPEC: CPT

## 2019-04-05 PROCEDURE — 93306 TTE W/DOPPLER COMPLETE: CPT

## 2019-04-05 PROCEDURE — 85610 PROTHROMBIN TIME: CPT

## 2019-04-05 PROCEDURE — 94640 AIRWAY INHALATION TREATMENT: CPT

## 2019-04-05 PROCEDURE — 80048 BASIC METABOLIC PNL TOTAL CA: CPT

## 2019-04-05 PROCEDURE — 70551 MRI BRAIN STEM W/O DYE: CPT

## 2019-04-05 PROCEDURE — 82962 GLUCOSE BLOOD TEST: CPT

## 2019-04-05 PROCEDURE — 97139 UNLISTED THERAPEUTIC PX: CPT

## 2019-04-05 PROCEDURE — 70450 CT HEAD/BRAIN W/O DYE: CPT

## 2019-04-05 PROCEDURE — 82553 CREATINE MB FRACTION: CPT

## 2019-04-05 PROCEDURE — 97535 SELF CARE MNGMENT TRAINING: CPT

## 2019-04-05 NOTE — CT
FCT Brain WO Con



History:Left arm numbness



Comparison: None



Findings: There is generalized ventricular and sulcal prominence. There is decreased attenuation to t
he periventricular white matter consistent with chronic white matter change. There are no signs of in
tracerebral hemorrhage or extra-axial fluid collections. The mastoid air cells and visualized sinuses
 are clear.



Impression: No acute intracranial abnormalities. Findings telephoned to Dr. Delgado at 1320 hours.



Reported By: Natanael Quezada 

Electronically Signed:  4/5/2019 1:21 PM

## 2019-04-05 NOTE — ULT
CAROTID ULTRASOUND:

4/5/19

 

HISTORY: 

Transient ischemic attack. 

 

COMPARISON:  

None.

 

TECHNIQUE:  

Gray scale, color flow, doppler imaging with spectral waveform analysis performed of the carotid and 
vertebral arteries. 

 

FINDINGS:  

 

RIGHT CAROTID:

Significant atherosclerotic disease involving the carotid bifurcation and proximal internal carotid a
rtery. Peak systolic velocity of the common carotid is 131.8 cm/s. Peak systolic velocity of the inte
rnal carotid artery is 115.8 cm/s. Systolic ICA to CCA ratio is 0.8. 

 

LEFT CAROTID:

There is atherosclerotic disease involving the entire common carotid artery, carotid bifurcation and 
internal carotid artery. Peak systolic velocity of the common carotid is 126.7 cm/s. Peak systolic ve
locity of the internal carotid artery is 104.7 cm/s. Systolic ICA to CCA ratio is 0.8. 

 

Antegrade flow in bilateral vertebral arteries. 

 

IMPRESSION:  

Sonographic evidence of hemodynamically significant stenosis. There is moderate (50-69%) stenosis inv
olving both carotid arteries. There is extensive atherosclerotic plaque. Better interrogation with CT
 angiogram of the neck is recommended.

 

POS: OFF

## 2019-04-05 NOTE — HP
CHIEF COMPLAINT:  Left upper extremity weakness.



HISTORY OF PRESENT ILLNESS:  The patient is a 74-year-old male with a history of

hypertension, CAD status post stent x2 six or 7 years ago, history of COPD, p.r.n.

oxygen dependent, BPH, who presents to the hospital with complaints of left arm

weakness.  The patient stated that he was sitting, watching TV, when he started

feeling some weakness to his left upper extremity.  The patient stated that his left

arm just kept flopping back and forth without any control.  He denied any other

symptoms.  The patient stated at this time, he called EMS and was brought into the

hospital. 



PAST MEDICAL HISTORY:  As of the followin. Hypertension.

2. COPD.

3. Colon cancer.

4. History of hemorrhoids.

5. CAD status post stents x2.

6. BPH.



PAST SURGICAL HISTORY:  He has had cardiac catheterization with stent placement.  He

has a history of colon cancer removal.  He has had hernia repair.  He had bilateral

cataract surgery.  He also has had significant injury to his left upper extremity

for which he has some limited mobility in that left arm. 



SOCIAL HISTORY:  The patient is .  He is a former smoker and he uses p.r.n.

oxygen.  He is a full code.  He denies any drug use or alcohol use. 



FAMILY HISTORY:  Negative for any GI malignancy.  However, he does have a family

history of coronary artery disease among several family members.  No history of

stroke. 



ALLERGIES:  HE HAS NO KNOWN DRUG ALLERGIES.



MEDICATIONS:  He takes,

1. Protonix 40 mg daily.

2. Aspirin 81 mg daily.

3. Crestor 20 mg daily.

4. Enalapril/hydrochlorothiazide 10-25 one p.o. daily.

5. Formoterol 20 mcg neb b.i.d.

6. Tamsulosin 0.4 p.o. daily.

7. Finasteride 5 mg p.o. daily.



REVIEW OF SYSTEMS:  All negative except for the ones mentioned above in the HPI.



LABORATORY DATA:  WBC is 7.0, hemoglobin of 12.4, hematocrit of 37.5, and platelets

of 263.  Chemistry; sodium of 133, potassium of 4.6, BUN of 18, creatinine 1.18.

His troponin x1 was negative.  The patient did have a CT head, which did not

indicate any acute abnormalities. 



ASSESSMENT AND PLAN:  The patient is a very pleasant 74-year-old male, who presents

to the hospital with complaints of left upper extremity weakness. 

1. Transient ischemic attack.  The patient's symptoms resolved when he got to the

ER.  The patient has been on aspirin 81 mg.  We will change it to Plavix 75 mg

daily.  We will change Crestor to Lipitor.  We will check a lipid panel in the

morning.  We will get an MRI of brain.  We will get an echocardiogram and carotid

Dopplers. 

2. History of chronic obstructive pulmonary disease, currently compensated.  We will

continue his p.r.n. oxygen use and his neb treatments. 

3. History of hypertension.  We will continue his home medications.

4. History of coronary artery disease, status post stents x2.  Currently, we will

continue his home medications. 

5. Deep venous thrombosis prophylaxis.  We will put the patient on some SCDs or

Lovenox.  The patient will probably need to follow up with PCP post discharge. 







Job ID:  [id number]

## 2019-04-06 VITALS — SYSTOLIC BLOOD PRESSURE: 151 MMHG | TEMPERATURE: 98.1 F | DIASTOLIC BLOOD PRESSURE: 84 MMHG

## 2019-04-06 LAB
ANION GAP SERPL CALC-SCNC: 9 MMOL/L (ref 10–20)
BASOPHILS # BLD AUTO: 0 THOU/UL (ref 0–0.2)
BASOPHILS NFR BLD AUTO: 0.5 % (ref 0–1)
BUN SERPL-MCNC: 16 MG/DL (ref 8.4–25.7)
CALCIUM SERPL-MCNC: 9.2 MG/DL (ref 7.8–10.44)
CHD RISK SERPL-RTO: 3.3 (ref ?–4.5)
CHLORIDE SERPL-SCNC: 100 MMOL/L (ref 98–107)
CHOLEST SERPL-MCNC: 135 MG/DL
CO2 SERPL-SCNC: 30 MMOL/L (ref 23–31)
CREAT CL PREDICTED SERPL C-G-VRATE: 61 ML/MIN (ref 70–130)
EOSINOPHIL # BLD AUTO: 0.3 THOU/UL (ref 0–0.7)
EOSINOPHIL NFR BLD AUTO: 5 % (ref 0–10)
GLUCOSE SERPL-MCNC: 99 MG/DL (ref 83–110)
HDLC SERPL-MCNC: 41 MG/DL
HGB BLD-MCNC: 11.9 G/DL (ref 14–18)
LDLC SERPL CALC-MCNC: 77 MG/DL
LYMPHOCYTES # BLD: 1.9 THOU/UL (ref 1.2–3.4)
LYMPHOCYTES NFR BLD AUTO: 28.7 % (ref 21–51)
MCH RBC QN AUTO: 30.8 PG (ref 27–31)
MCV RBC AUTO: 92.3 FL (ref 78–98)
MONOCYTES # BLD AUTO: 0.7 THOU/UL (ref 0.11–0.59)
MONOCYTES NFR BLD AUTO: 11.2 % (ref 0–10)
NEUTROPHILS # BLD AUTO: 3.5 THOU/UL (ref 1.4–6.5)
NEUTROPHILS NFR BLD AUTO: 54.7 % (ref 42–75)
PLATELET # BLD AUTO: 248 THOU/UL (ref 130–400)
POTASSIUM SERPL-SCNC: 3.8 MMOL/L (ref 3.5–5.1)
RBC # BLD AUTO: 3.86 MILL/UL (ref 4.7–6.1)
SODIUM SERPL-SCNC: 135 MMOL/L (ref 136–145)
TRIGL SERPL-MCNC: 86 MG/DL (ref ?–150)
WBC # BLD AUTO: 6.5 THOU/UL (ref 4.8–10.8)

## 2019-04-06 NOTE — MRI
FBrain MRI without contrast:

4/6/2019



COMPARISON: None



HISTORY: Sudden onset of left upper extremity tingling and numbness



TECHNIQUE: Multiplanar multisequence MR imaging of the brain obtained without contrast



FINDINGS: The diffusion weighted imaging demonstrates no evidence for acute infarction. The axial gra
dient echo imaging demonstrates no evidence for intracranial hemorrhage.



Regional bone marrow signal intensity is within normal limits. There is extensive periventricular, de
ep, and subcortical white matter T2 and FLAIR hyperintensity, evidence of prominent small vessel dise
ase. Arterial flow voids at axial level of skull base appear grossly unremarkable on the T2-weighted 
imaging. Paranasal sinuses and mastoid air cells appear grossly unremarkable. No midline shift or mas
s effect.



IMPRESSION: Prominent small vessel disease with no evidence for intracranial hemorrhage or acute infa
rction.



Reported By: Tiburcio Gallego 

Electronically Signed:  4/6/2019 11:33 AM

## 2019-04-06 NOTE — CON
DATE OF CONSULTATION:  04/06/2019



CONSULTING PHYSICIAN:  [facility].



IMPRESSION:  

1. Transient ischemic attack with transient focal seizure activity.

2. Aspirin failure.

3. Moderate carotid disease bilaterally.



PLAN:  

1. Add Plavix 75 mg per day.

2. Continue aspirin.

3. Continue Crestor.



HISTORY OF PRESENT ILLNESS:  Mr. Ambriz is a 74-year-old gentleman who presented with

acute onset of left arm tingling followed by uncontrolled movements that lasted

about 2 to 3 minutes.  His symptoms resolved spontaneously, never had anything like

this before.  There was no involvement of his speech.  He had no loss of

consciousness.  There was no loss of control of the leg.  He did not have a

headache, nausea, vomiting, vertigo, chest pain, or shortness of breath.  He

presented to the ER last night with these symptoms and had a CT scan of the brain

done.  There was decreased attenuation in the white matter consistent with some

chronic small-vessel ischemic changes.  Carotid ultrasound shows bilateral moderate

50% to 69% stenosis.  Laboratory studies; unremarkable CBC and serum chemistries

with a cholesterol ratio of 3.3.  He has otherwise been stable overnight.  No

further symptoms have occurred. 



PAST MEDICAL HISTORY:  COPD, hypokalemia, hyponatremia, BPH, hypertension, CAD.



ALLERGIES:  NONE REPORTED.



SOCIAL HISTORY:  No alcohol or illicit drug use.



FAMILY HISTORY:  Noncontributory.



MEDICATIONS:  Medication list was reviewed and includes Lipitor and aspirin.  Plavix

has been added. 



REVIEW OF SYSTEMS:  A 10-system review of systems is otherwise negative.



PHYSICAL EXAMINATION:

VITAL SIGNS:  Blood pressure 130/71, pulse 64, respirations 16, and temperature

98.0. 

HEENT:  Pupils are equal and reactive.  Conjunctivae clear.  Oropharynx clear.

Cranium, normocephalic and atraumatic. 

NECK:  Supple.  No lymphadenopathy. 

EXTREMITIES:  No cyanosis, clubbing, or edema. 

NEUROLOGIC:  He is alert and appropriate.  His speech is fluent and clear.  Cranial

nerves 2 through 12 are intact.  Motor exam shows good  strength bilaterally.

There was no fix or drift.  Sensation was intact to light touch.  Finger-to-nose and

rapid alternating movements were symmetric.  Gait is intact.  No abnormal movements

were seen. 



DIAGNOSTIC DATA:  Imaging was reviewed.



SUMMARY:  A 74-year-old gentleman with acute numbness followed by uncontrolled

movements of the left arm, suggestive of a transient ischemic attack.  I agree with

the addition of Plavix as his echocardiogram is pending.  He appears otherwise

stable at this point. 







Job ID:  [id number]

## 2019-10-03 ENCOUNTER — HOSPITAL ENCOUNTER (EMERGENCY)
Dept: HOSPITAL 92 - SCSER | Age: 74
Discharge: HOME | End: 2019-10-03
Payer: MEDICARE

## 2019-10-03 ENCOUNTER — HOSPITAL ENCOUNTER (OUTPATIENT)
Dept: HOSPITAL 92 - CT | Age: 74
Discharge: HOME | End: 2019-10-03
Attending: FAMILY MEDICINE
Payer: MEDICARE

## 2019-10-03 DIAGNOSIS — S22.009A: ICD-10-CM

## 2019-10-03 DIAGNOSIS — J44.9: ICD-10-CM

## 2019-10-03 DIAGNOSIS — Z87.891: ICD-10-CM

## 2019-10-03 DIAGNOSIS — I10: ICD-10-CM

## 2019-10-03 DIAGNOSIS — Z85.038: ICD-10-CM

## 2019-10-03 DIAGNOSIS — Z00.00: Primary | ICD-10-CM

## 2019-10-03 DIAGNOSIS — E86.0: Primary | ICD-10-CM

## 2019-10-03 LAB
ALBUMIN SERPL BCG-MCNC: 4.1 G/DL (ref 3.4–4.8)
ALP SERPL-CCNC: 71 U/L (ref 40–110)
ALT SERPL W P-5'-P-CCNC: 25 U/L (ref 8–55)
ANION GAP SERPL CALC-SCNC: 17 MMOL/L (ref 10–20)
AST SERPL-CCNC: 27 U/L (ref 5–34)
BASOPHILS # BLD AUTO: 0.1 THOU/UL (ref 0–0.2)
BASOPHILS NFR BLD AUTO: 1.5 % (ref 0–1)
BILIRUB SERPL-MCNC: 0.3 MG/DL (ref 0.2–1.2)
BUN SERPL-MCNC: 17 MG/DL (ref 8.4–25.7)
CALCIUM SERPL-MCNC: 9.5 MG/DL (ref 7.8–10.44)
CHLORIDE SERPL-SCNC: 102 MMOL/L (ref 98–107)
CO2 SERPL-SCNC: 24 MMOL/L (ref 23–31)
CREAT CL PREDICTED SERPL C-G-VRATE: 0 ML/MIN (ref 70–130)
EOSINOPHIL # BLD AUTO: 0.3 THOU/UL (ref 0–0.7)
EOSINOPHIL NFR BLD AUTO: 3.2 % (ref 0–10)
GLOBULIN SER CALC-MCNC: 3.5 G/DL (ref 2.4–3.5)
GLUCOSE SERPL-MCNC: 162 MG/DL (ref 83–110)
HGB BLD-MCNC: 13.1 G/DL (ref 14–18)
LIPASE SERPL-CCNC: 32 U/L (ref 8–78)
LYMPHOCYTES # BLD: 2.2 THOU/UL (ref 1.2–3.4)
LYMPHOCYTES NFR BLD AUTO: 24.4 % (ref 21–51)
MCH RBC QN AUTO: 29.8 PG (ref 27–31)
MCV RBC AUTO: 89.4 FL (ref 78–98)
MONOCYTES # BLD AUTO: 0.7 THOU/UL (ref 0.11–0.59)
MONOCYTES NFR BLD AUTO: 8.1 % (ref 0–10)
NEUTROPHILS # BLD AUTO: 5.5 THOU/UL (ref 1.4–6.5)
NEUTROPHILS NFR BLD AUTO: 62.8 % (ref 42–75)
PLATELET # BLD AUTO: 259 THOU/UL (ref 130–400)
POTASSIUM SERPL-SCNC: 4.2 MMOL/L (ref 3.5–5.1)
RBC # BLD AUTO: 4.38 MILL/UL (ref 4.7–6.1)
SODIUM SERPL-SCNC: 139 MMOL/L (ref 136–145)
WBC # BLD AUTO: 8.8 THOU/UL (ref 4.8–10.8)

## 2019-10-03 PROCEDURE — 85025 COMPLETE CBC W/AUTO DIFF WBC: CPT

## 2019-10-03 PROCEDURE — 93005 ELECTROCARDIOGRAM TRACING: CPT

## 2019-10-03 PROCEDURE — 84484 ASSAY OF TROPONIN QUANT: CPT

## 2019-10-03 PROCEDURE — 83880 ASSAY OF NATRIURETIC PEPTIDE: CPT

## 2019-10-03 PROCEDURE — 85379 FIBRIN DEGRADATION QUANT: CPT

## 2019-10-03 PROCEDURE — 96360 HYDRATION IV INFUSION INIT: CPT

## 2019-10-03 PROCEDURE — G0297 LDCT FOR LUNG CA SCREEN: HCPCS

## 2019-10-03 PROCEDURE — 80053 COMPREHEN METABOLIC PANEL: CPT

## 2019-10-03 PROCEDURE — 83690 ASSAY OF LIPASE: CPT

## 2019-10-03 PROCEDURE — 81003 URINALYSIS AUTO W/O SCOPE: CPT

## 2019-10-03 PROCEDURE — 96361 HYDRATE IV INFUSION ADD-ON: CPT

## 2019-10-03 PROCEDURE — 83605 ASSAY OF LACTIC ACID: CPT

## 2019-10-03 PROCEDURE — 84443 ASSAY THYROID STIM HORMONE: CPT

## 2019-10-03 NOTE — CT
EXAM:

CT Pulmonary Lung Scan



PROVIDED CLINICAL HISTORY:

Personal history of tobacco use. History of COPD. Smoking history for 30 years with 2 packs per day.



COMPARISON:

10/2/2018



FINDINGS:

Emphysematous changes are again seen within the lungs bilaterally. Mild biapical pleural and parenchy
mal scarring is again present. Linear and slight nodular densities are seen in the left upper lobe

which is likely related to scarring. There is a stable tiny pleural-based subcentimeter nodule at the
 posterolateral left upper lobe. No discrete pulmonary nodule or mass is otherwise seen within the

lungs bilaterally. No pleural effusion is identified. There are linear densities seen at the posterio
r right lung base which may be related to atelectasis or mild scarring.



Prominent vascular calcifications are again seen in the coronary arteries as well as involving the th
oracic aorta.



Calcified left hilar lymph nodes are again seen with calcified left lower lobe granuloma.



There is incomplete visualization of an exophytic hypodense lesion in the superior pole right kidney 
as well as at the medial aspect of the left kidney. These findings were seen on prior CT abdomen in

2016. These lesions are incompletely imaged or able to be further characterized on this study.



There are remote compression fractures involving several thoracic vertebral bodies stable from prior 
study in 2018. Multilevel degenerative changes are seen in the thoracic spine.



IMPRESSION:



1. Lung RADS category 1, no suspicious pulmonary nodules seen. Continued annual screening with low-do
se CT scan in 12 months is recommended.

2. Lung RADS category S, extensive vascular calcifications involving the coronary arteries and thorac
ic aorta.

3. Evidence of COPD as well as chronic lung changes including scarring in the left upper lobe and rig
ht lung base.

4. Incompletely imaged or evaluated hypodense lesions superior pole of each kidney which were present
 on prior CT abdomen in 2016.

5. Stable compression fractures thoracic spine.



Reported By: Bossman Martinez 

Electronically Signed:  10/3/2019 1:39 PM

## 2019-10-05 ENCOUNTER — HOSPITAL ENCOUNTER (EMERGENCY)
Dept: HOSPITAL 92 - SCSER | Age: 74
Discharge: HOME | End: 2019-10-05
Payer: MEDICARE

## 2019-10-05 DIAGNOSIS — Z87.891: ICD-10-CM

## 2019-10-05 DIAGNOSIS — J44.9: ICD-10-CM

## 2019-10-05 DIAGNOSIS — I10: Primary | ICD-10-CM

## 2019-10-05 PROCEDURE — 99283 EMERGENCY DEPT VISIT LOW MDM: CPT

## 2023-09-11 ENCOUNTER — HOSPITAL ENCOUNTER (INPATIENT)
Dept: HOSPITAL 92 - CSHERS | Age: 78
LOS: 4 days | Discharge: HOME HEALTH | DRG: 871 | End: 2023-09-15
Attending: INTERNAL MEDICINE | Admitting: STUDENT IN AN ORGANIZED HEALTH CARE EDUCATION/TRAINING PROGRAM
Payer: MEDICARE

## 2023-09-11 VITALS — BODY MASS INDEX: 21.8 KG/M2

## 2023-09-11 DIAGNOSIS — J18.9: ICD-10-CM

## 2023-09-11 DIAGNOSIS — C18.9: ICD-10-CM

## 2023-09-11 DIAGNOSIS — R33.9: ICD-10-CM

## 2023-09-11 DIAGNOSIS — Z88.8: ICD-10-CM

## 2023-09-11 DIAGNOSIS — A41.9: Primary | ICD-10-CM

## 2023-09-11 DIAGNOSIS — N17.9: ICD-10-CM

## 2023-09-11 DIAGNOSIS — E87.1: ICD-10-CM

## 2023-09-11 DIAGNOSIS — Z90.49: ICD-10-CM

## 2023-09-11 DIAGNOSIS — J12.82: ICD-10-CM

## 2023-09-11 DIAGNOSIS — U07.1: ICD-10-CM

## 2023-09-11 DIAGNOSIS — E78.5: ICD-10-CM

## 2023-09-11 DIAGNOSIS — R65.21: ICD-10-CM

## 2023-09-11 DIAGNOSIS — G93.41: ICD-10-CM

## 2023-09-11 DIAGNOSIS — I11.0: ICD-10-CM

## 2023-09-11 DIAGNOSIS — Z79.899: ICD-10-CM

## 2023-09-11 DIAGNOSIS — Z95.5: ICD-10-CM

## 2023-09-11 DIAGNOSIS — I25.10: ICD-10-CM

## 2023-09-11 DIAGNOSIS — Z87.891: ICD-10-CM

## 2023-09-11 DIAGNOSIS — Z86.16: ICD-10-CM

## 2023-09-11 DIAGNOSIS — Z79.82: ICD-10-CM

## 2023-09-11 DIAGNOSIS — J96.21: ICD-10-CM

## 2023-09-11 DIAGNOSIS — I35.0: ICD-10-CM

## 2023-09-11 DIAGNOSIS — I50.33: ICD-10-CM

## 2023-09-11 DIAGNOSIS — J44.1: ICD-10-CM

## 2023-09-11 DIAGNOSIS — I21.4: ICD-10-CM

## 2023-09-11 DIAGNOSIS — R57.9: ICD-10-CM

## 2023-09-11 LAB
ALBUMIN SERPL BCG-MCNC: 3.9 G/DL (ref 3.4–4.8)
ALP SERPL-CCNC: 71 U/L (ref 40–110)
ALT SERPL W P-5'-P-CCNC: 39 U/L (ref 8–55)
ANALYZER IN CARDIO: (no result)
ANION GAP SERPL CALC-SCNC: 16 MMOL/L (ref 10–20)
ANION GAP SERPL CALC-SCNC: 22 MMOL/L (ref 10–20)
AST SERPL-CCNC: 57 U/L (ref 5–34)
BASE EXCESS STD BLDA CALC-SCNC: -5.1 MEQ/L
BASE EXCESS STD BLDV CALC-SCNC: -6.3 MEQ/L
BASE EXCESS STD BLDV CALC-SCNC: -7.9 MEQ/L
BASOPHILS # BLD AUTO: 0.1 10X3/UL (ref 0–0.2)
BASOPHILS NFR BLD AUTO: 0.9 % (ref 0–2)
BILIRUB SERPL-MCNC: 0.4 MG/DL (ref 0.2–1.2)
BUN SERPL-MCNC: 19 MG/DL (ref 8.4–25.7)
BUN SERPL-MCNC: 21 MG/DL (ref 8.4–25.7)
CA-I BLDA-SCNC: 1.17 MMOL/L (ref 1.12–1.3)
CA-I BLDV-MCNC: 1.09 MMOL/L (ref 1.16–1.32)
CA-I BLDV-MCNC: 1.09 MMOL/L (ref 1.16–1.32)
CALCIUM SERPL-MCNC: 7.9 MG/DL (ref 7.8–10.44)
CALCIUM SERPL-MCNC: 8.8 MG/DL (ref 7.8–10.44)
CHLORIDE BLDV-SCNC: 101 MMOL/L (ref 98–106)
CHLORIDE BLDV-SCNC: 99 MMOL/L (ref 98–106)
CHLORIDE SERPL-SCNC: 100 MMOL/L (ref 98–107)
CHLORIDE SERPL-SCNC: 100 MMOL/L (ref 98–107)
CO2 SERPL-SCNC: 16 MMOL/L (ref 23–31)
CO2 SERPL-SCNC: 19 MMOL/L (ref 23–31)
CREAT CL PREDICTED SERPL C-G-VRATE: 0 ML/MIN (ref 70–130)
CREAT CL PREDICTED SERPL C-G-VRATE: 44 ML/MIN (ref 70–130)
EOSINOPHIL # BLD AUTO: 0 10X3/UL (ref 0–0.5)
EOSINOPHIL NFR BLD AUTO: 0.3 % (ref 0–6)
GLOBULIN SER CALC-MCNC: 2.8 G/DL (ref 2.4–3.5)
GLUCOSE SERPL-MCNC: 243 MG/DL (ref 83–110)
GLUCOSE SERPL-MCNC: 254 MG/DL (ref 83–110)
HCO3 BLDA-SCNC: 20.7 MEQ/L (ref 22–28)
HCO3 BLDV-SCNC: 19.6 MEQ/L (ref 22–28)
HCO3 BLDV-SCNC: 20.4 MEQ/L (ref 22–28)
HCT VFR BLD CALC: 46.7 % (ref 38.8–50)
HCT VFR BLDA CALC: 42 % (ref 42–52)
HCT VFR BLDV CALC: 43 % (ref 42–52)
HCT VFR BLDV CALC: 48 % (ref 42–52)
HGB BLD-MCNC: 15.1 G/DL (ref 13.5–17.5)
HGB BLDA-MCNC: 14.3 G/DL (ref 14–18)
HGB BLDV-MCNC: 14.5 G/DL (ref 12.6–17.4)
HGB BLDV-MCNC: 16.2 G/DL (ref 12.6–17.4)
LIPASE SERPL-CCNC: 25 U/L (ref 8–78)
LYMPHOCYTES NFR BLD AUTO: 51 % (ref 18–47)
MAGNESIUM SERPL-MCNC: 1.9 MG/DL (ref 1.6–2.6)
MCH RBC QN AUTO: 30.1 PG (ref 27–33)
MCV RBC AUTO: 93 FL (ref 81.2–95.1)
MONOCYTES # BLD AUTO: 0.9 10X3/UL (ref 0–1.1)
MONOCYTES NFR BLD AUTO: 9 % (ref 0–10)
NEUTROPHILS # BLD AUTO: 3.9 10X3/UL (ref 1.5–8.4)
NEUTROPHILS NFR BLD AUTO: 38.7 % (ref 40–75)
PCO2 BLDA: 41 MMHG (ref 35–45)
PH BLDA: 7.32 [PH] (ref 7.35–7.45)
PLATELET # BLD AUTO: 212 10X3/UL (ref 150–450)
PO2 BLDA: 481.9 MMHG (ref 60–?)
POTASSIUM BLD-SCNC: 4.29 MMOL/L (ref 3.7–5.3)
POTASSIUM BLDV-SCNC: 4.23 MMOL/L (ref 3.7–5.3)
POTASSIUM BLDV-SCNC: 4.89 MMOL/L (ref 3.7–5.3)
POTASSIUM SERPL-SCNC: 4.3 MMOL/L (ref 3.5–5.1)
POTASSIUM SERPL-SCNC: 5.2 MMOL/L (ref 3.5–5.1)
RAPIDCOMM COLLECT BY: (no result)
RAPIDCOMM COLLECT BY: (no result)
RBC # BLD AUTO: 5.02 10X6/UL (ref 4.32–5.72)
S PNEUM AG UR QL: NEGATIVE
SARS-COV-2 RNA RESP QL NAA+PROBE: DETECTED
SODIUM BLDV-SCNC: 133.6 MMOL/L (ref 133–146)
SODIUM BLDV-SCNC: 134.4 MMOL/L (ref 133–146)
SODIUM SERPL-SCNC: 131 MMOL/L (ref 136–145)
SODIUM SERPL-SCNC: 133 MMOL/L (ref 136–145)
SPECIMEN DRAWN FROM PATIENT: (no result)
TROPONIN I SERPL DL<=0.01 NG/ML-MCNC: 0.24 NG/ML (ref ?–0.03)
TROPONIN I SERPL DL<=0.01 NG/ML-MCNC: 1.73 NG/ML (ref ?–0.03)
TROPONIN I SERPL DL<=0.01 NG/ML-MCNC: 2.5 NG/ML (ref ?–0.03)
WBC # BLD AUTO: 10.1 10X3/UL (ref 3.5–10.5)

## 2023-09-11 PROCEDURE — 80048 BASIC METABOLIC PNL TOTAL CA: CPT

## 2023-09-11 PROCEDURE — 94660 CPAP INITIATION&MGMT: CPT

## 2023-09-11 PROCEDURE — 87449 NOS EACH ORGANISM AG IA: CPT

## 2023-09-11 PROCEDURE — 93458 L HRT ARTERY/VENTRICLE ANGIO: CPT

## 2023-09-11 PROCEDURE — 85025 COMPLETE CBC W/AUTO DIFF WBC: CPT

## 2023-09-11 PROCEDURE — 94760 N-INVAS EAR/PLS OXIMETRY 1: CPT

## 2023-09-11 PROCEDURE — 87070 CULTURE OTHR SPECIMN AEROBIC: CPT

## 2023-09-11 PROCEDURE — XW0DXF5 INTRODUCTION OF OTHER NEW TECHNOLOGY THERAPEUTIC SUBSTANCE INTO MOUTH AND PHARYNX, EXTERNAL APPROACH, NEW TECHNOLOGY GROUP 5: ICD-10-PCS | Performed by: INTERNAL MEDICINE

## 2023-09-11 PROCEDURE — 87899 AGENT NOS ASSAY W/OPTIC: CPT

## 2023-09-11 PROCEDURE — 05HM33Z INSERTION OF INFUSION DEVICE INTO RIGHT INTERNAL JUGULAR VEIN, PERCUTANEOUS APPROACH: ICD-10-PCS | Performed by: INTERNAL MEDICINE

## 2023-09-11 PROCEDURE — 83605 ASSAY OF LACTIC ACID: CPT

## 2023-09-11 PROCEDURE — C9113 INJ PANTOPRAZOLE SODIUM, VIA: HCPCS

## 2023-09-11 PROCEDURE — 86900 BLOOD TYPING SEROLOGIC ABO: CPT

## 2023-09-11 PROCEDURE — 4A133R1 MONITORING OF ARTERIAL SATURATION, PERIPHERAL, PERCUTANEOUS APPROACH: ICD-10-PCS | Performed by: INTERNAL MEDICINE

## 2023-09-11 PROCEDURE — 87081 CULTURE SCREEN ONLY: CPT

## 2023-09-11 PROCEDURE — 94799 UNLISTED PULMONARY SVC/PX: CPT

## 2023-09-11 PROCEDURE — 36415 COLL VENOUS BLD VENIPUNCTURE: CPT

## 2023-09-11 PROCEDURE — 94762 N-INVAS EAR/PLS OXIMTRY CONT: CPT

## 2023-09-11 PROCEDURE — 93005 ELECTROCARDIOGRAM TRACING: CPT

## 2023-09-11 PROCEDURE — 36600 WITHDRAWAL OF ARTERIAL BLOOD: CPT

## 2023-09-11 PROCEDURE — C1887 CATHETER, GUIDING: HCPCS

## 2023-09-11 PROCEDURE — 3E04329 INTRODUCTION OF OTHER ANTI-INFECTIVE INTO CENTRAL VEIN, PERCUTANEOUS APPROACH: ICD-10-PCS | Performed by: INTERNAL MEDICINE

## 2023-09-11 PROCEDURE — 87040 BLOOD CULTURE FOR BACTERIA: CPT

## 2023-09-11 PROCEDURE — 80061 LIPID PANEL: CPT

## 2023-09-11 PROCEDURE — 94640 AIRWAY INHALATION TREATMENT: CPT

## 2023-09-11 PROCEDURE — 84484 ASSAY OF TROPONIN QUANT: CPT

## 2023-09-11 PROCEDURE — C1894 INTRO/SHEATH, NON-LASER: HCPCS

## 2023-09-11 PROCEDURE — 84145 PROCALCITONIN (PCT): CPT

## 2023-09-11 PROCEDURE — C1769 GUIDE WIRE: HCPCS

## 2023-09-11 PROCEDURE — 87205 SMEAR GRAM STAIN: CPT

## 2023-09-11 PROCEDURE — 83690 ASSAY OF LIPASE: CPT

## 2023-09-11 PROCEDURE — 80076 HEPATIC FUNCTION PANEL: CPT

## 2023-09-11 PROCEDURE — 80053 COMPREHEN METABOLIC PANEL: CPT

## 2023-09-11 PROCEDURE — 84443 ASSAY THYROID STIM HORMONE: CPT

## 2023-09-11 PROCEDURE — 93306 TTE W/DOPPLER COMPLETE: CPT

## 2023-09-11 PROCEDURE — S0028 INJECTION, FAMOTIDINE, 20 MG: HCPCS

## 2023-09-11 PROCEDURE — 83735 ASSAY OF MAGNESIUM: CPT

## 2023-09-11 PROCEDURE — 3E043XZ INTRODUCTION OF VASOPRESSOR INTO CENTRAL VEIN, PERCUTANEOUS APPROACH: ICD-10-PCS | Performed by: INTERNAL MEDICINE

## 2023-09-11 PROCEDURE — 83036 HEMOGLOBIN GLYCOSYLATED A1C: CPT

## 2023-09-11 PROCEDURE — 86140 C-REACTIVE PROTEIN: CPT

## 2023-09-11 PROCEDURE — 84100 ASSAY OF PHOSPHORUS: CPT

## 2023-09-11 PROCEDURE — 86901 BLOOD TYPING SEROLOGIC RH(D): CPT

## 2023-09-11 PROCEDURE — 82805 BLOOD GASES W/O2 SATURATION: CPT

## 2023-09-11 PROCEDURE — 71045 X-RAY EXAM CHEST 1 VIEW: CPT

## 2023-09-11 PROCEDURE — 86850 RBC ANTIBODY SCREEN: CPT

## 2023-09-11 PROCEDURE — 5A09457 ASSISTANCE WITH RESPIRATORY VENTILATION, 24-96 CONSECUTIVE HOURS, CONTINUOUS POSITIVE AIRWAY PRESSURE: ICD-10-PCS | Performed by: INTERNAL MEDICINE

## 2023-09-11 PROCEDURE — 85610 PROTHROMBIN TIME: CPT

## 2023-09-11 PROCEDURE — 3E0433Z INTRODUCTION OF ANTI-INFLAMMATORY INTO CENTRAL VEIN, PERCUTANEOUS APPROACH: ICD-10-PCS | Performed by: INTERNAL MEDICINE

## 2023-09-11 PROCEDURE — 85730 THROMBOPLASTIN TIME PARTIAL: CPT

## 2023-09-11 PROCEDURE — 83880 ASSAY OF NATRIURETIC PEPTIDE: CPT

## 2023-09-11 PROCEDURE — 99152 MOD SED SAME PHYS/QHP 5/>YRS: CPT

## 2023-09-11 RX ADMIN — NIRMATRELVIR AND RITONAVIR SCH: KIT at 22:20

## 2023-09-11 RX ADMIN — NIRMATRELVIR AND RITONAVIR SCH TAB: KIT at 21:46

## 2023-09-12 LAB
ALBUMIN SERPL BCG-MCNC: 3.3 G/DL (ref 3.4–4.8)
ALP SERPL-CCNC: 50 U/L (ref 40–110)
ALT SERPL W P-5'-P-CCNC: 35 U/L (ref 8–55)
ANION GAP SERPL CALC-SCNC: 12 MMOL/L (ref 10–20)
APTT PPP: 35 SEC (ref 22–33)
AST SERPL-CCNC: 57 U/L (ref 5–34)
BASOPHILS # BLD AUTO: 0 10X3/UL (ref 0–0.2)
BASOPHILS NFR BLD AUTO: 0 % (ref 0–2)
BILIRUB DIRECT SERPL-MCNC: 0.2 MG/DL (ref 0.1–0.3)
BILIRUB SERPL-MCNC: 0.3 MG/DL (ref 0.2–1.2)
BUN SERPL-MCNC: 21 MG/DL (ref 8.4–25.7)
CALCIUM SERPL-MCNC: 7.9 MG/DL (ref 7.8–10.44)
CHD RISK SERPL-RTO: 3.1 (ref ?–4.5)
CHLORIDE SERPL-SCNC: 101 MMOL/L (ref 98–107)
CHOLEST SERPL-MCNC: 118 MG/DL
CO2 SERPL-SCNC: 24 MMOL/L (ref 23–31)
CREAT CL PREDICTED SERPL C-G-VRATE: 58 ML/MIN (ref 70–130)
EOSINOPHIL # BLD AUTO: 0 10X3/UL (ref 0–0.5)
EOSINOPHIL NFR BLD AUTO: 0 % (ref 0–6)
GLUCOSE SERPL-MCNC: 128 MG/DL (ref 83–110)
HCT VFR BLD CALC: 36.1 % (ref 38.8–50)
HDLC SERPL-MCNC: 38 MG/DL
HGB BLD-MCNC: 12.2 G/DL (ref 13.5–17.5)
INR PPP: 1.1
LDLC SERPL CALC-MCNC: 68 MG/DL
LYMPHOCYTES NFR BLD AUTO: 10.3 % (ref 18–47)
MAGNESIUM SERPL-MCNC: 2.3 MG/DL (ref 1.6–2.6)
MCH RBC QN AUTO: 30.2 PG (ref 27–33)
MCV RBC AUTO: 89.6 FL (ref 81.2–95.1)
MONOCYTES # BLD AUTO: 0.7 10X3/UL (ref 0–1.1)
MONOCYTES NFR BLD AUTO: 10.6 % (ref 0–10)
NEUTROPHILS # BLD AUTO: 4.9 10X3/UL (ref 1.5–8.4)
NEUTROPHILS NFR BLD AUTO: 78.9 % (ref 40–75)
PLATELET # BLD AUTO: 160 10X3/UL (ref 150–450)
POTASSIUM SERPL-SCNC: 4.1 MMOL/L (ref 3.5–5.1)
PROTHROMBIN TIME: 11.6 SEC (ref 9.5–12.1)
RBC # BLD AUTO: 4.04 10X6/UL (ref 4.32–5.72)
SODIUM SERPL-SCNC: 133 MMOL/L (ref 136–145)
TRIGL SERPL-MCNC: 61 MG/DL (ref ?–150)
WBC # BLD AUTO: 6.2 10X3/UL (ref 3.5–10.5)

## 2023-09-12 PROCEDURE — 4A023N7 MEASUREMENT OF CARDIAC SAMPLING AND PRESSURE, LEFT HEART, PERCUTANEOUS APPROACH: ICD-10-PCS | Performed by: SPECIALIST

## 2023-09-12 PROCEDURE — B211YZZ FLUOROSCOPY OF MULTIPLE CORONARY ARTERIES USING OTHER CONTRAST: ICD-10-PCS | Performed by: SPECIALIST

## 2023-09-12 RX ADMIN — ALBUTEROL SULFATE SCH: 90 AEROSOL, METERED RESPIRATORY (INHALATION) at 20:00

## 2023-09-12 RX ADMIN — ALBUTEROL SULFATE SCH PUFF: 90 AEROSOL, METERED RESPIRATORY (INHALATION) at 13:30

## 2023-09-12 RX ADMIN — AZITHROMYCIN SCH MLS: 500 INJECTION, POWDER, LYOPHILIZED, FOR SOLUTION INTRAVENOUS at 05:17

## 2023-09-12 RX ADMIN — ALBUTEROL SULFATE SCH PUFF: 90 AEROSOL, METERED RESPIRATORY (INHALATION) at 07:00

## 2023-09-12 RX ADMIN — NIRMATRELVIR AND RITONAVIR SCH TAB: KIT at 10:08

## 2023-09-12 RX ADMIN — ASPIRIN SCH MG: 81 TABLET ORAL at 09:43

## 2023-09-12 RX ADMIN — ALBUTEROL SULFATE SCH PUFF: 90 AEROSOL, METERED RESPIRATORY (INHALATION) at 01:24

## 2023-09-13 LAB
ANION GAP SERPL CALC-SCNC: 15 MMOL/L (ref 10–20)
BASOPHILS # BLD AUTO: 0 10X3/UL (ref 0–0.2)
BASOPHILS NFR BLD AUTO: 0.2 % (ref 0–2)
BUN SERPL-MCNC: 33 MG/DL (ref 8.4–25.7)
CALCIUM SERPL-MCNC: 8.2 MG/DL (ref 7.8–10.44)
CHLORIDE SERPL-SCNC: 101 MMOL/L (ref 98–107)
CO2 SERPL-SCNC: 26 MMOL/L (ref 23–31)
CREAT CL PREDICTED SERPL C-G-VRATE: 52 ML/MIN (ref 70–130)
EOSINOPHIL # BLD AUTO: 0 10X3/UL (ref 0–0.5)
EOSINOPHIL NFR BLD AUTO: 0 % (ref 0–6)
GLUCOSE SERPL-MCNC: 117 MG/DL (ref 83–110)
HCT VFR BLD CALC: 37.2 % (ref 38.8–50)
HGB BLD-MCNC: 12.4 G/DL (ref 13.5–17.5)
LYMPHOCYTES NFR BLD AUTO: 8 % (ref 18–47)
MAGNESIUM SERPL-MCNC: 2.2 MG/DL (ref 1.6–2.6)
MCH RBC QN AUTO: 30 PG (ref 27–33)
MCV RBC AUTO: 90.1 FL (ref 81.2–95.1)
MONOCYTES # BLD AUTO: 1.1 10X3/UL (ref 0–1.1)
MONOCYTES NFR BLD AUTO: 11.5 % (ref 0–10)
NEUTROPHILS # BLD AUTO: 7.3 10X3/UL (ref 1.5–8.4)
NEUTROPHILS NFR BLD AUTO: 79.9 % (ref 40–75)
PLATELET # BLD AUTO: 145 10X3/UL (ref 150–450)
POTASSIUM SERPL-SCNC: 4.2 MMOL/L (ref 3.5–5.1)
RBC # BLD AUTO: 4.13 10X6/UL (ref 4.32–5.72)
SODIUM SERPL-SCNC: 138 MMOL/L (ref 136–145)
WBC # BLD AUTO: 9.1 10X3/UL (ref 3.5–10.5)

## 2023-09-13 RX ADMIN — NIRMATRELVIR AND RITONAVIR SCH EACH: KIT at 20:15

## 2023-09-13 RX ADMIN — SACUBITRIL AND VALSARTAN SCH TAB: 24; 26 TABLET, FILM COATED ORAL at 20:12

## 2023-09-13 RX ADMIN — ASPIRIN SCH MG: 81 TABLET ORAL at 08:12

## 2023-09-13 RX ADMIN — ALBUTEROL SULFATE SCH PUFF: 90 AEROSOL, METERED RESPIRATORY (INHALATION) at 09:45

## 2023-09-13 RX ADMIN — ALBUTEROL SULFATE SCH: 90 AEROSOL, METERED RESPIRATORY (INHALATION) at 01:00

## 2023-09-13 RX ADMIN — ALBUTEROL SULFATE SCH PUFF: 90 AEROSOL, METERED RESPIRATORY (INHALATION) at 13:00

## 2023-09-13 RX ADMIN — SACUBITRIL AND VALSARTAN SCH TAB: 24; 26 TABLET, FILM COATED ORAL at 09:59

## 2023-09-13 RX ADMIN — AZITHROMYCIN SCH MLS: 500 INJECTION, POWDER, LYOPHILIZED, FOR SOLUTION INTRAVENOUS at 06:29

## 2023-09-13 RX ADMIN — ALBUTEROL SULFATE SCH PUFF: 90 AEROSOL, METERED RESPIRATORY (INHALATION) at 19:30

## 2023-09-14 LAB
ANION GAP SERPL CALC-SCNC: 15 MMOL/L (ref 10–20)
BASOPHILS # BLD AUTO: 0 10X3/UL (ref 0–0.2)
BASOPHILS NFR BLD AUTO: 0.1 % (ref 0–2)
BUN SERPL-MCNC: 31 MG/DL (ref 8.4–25.7)
CALCIUM SERPL-MCNC: 8.3 MG/DL (ref 7.8–10.44)
CHLORIDE SERPL-SCNC: 101 MMOL/L (ref 98–107)
CO2 SERPL-SCNC: 28 MMOL/L (ref 23–31)
CREAT CL PREDICTED SERPL C-G-VRATE: 63 ML/MIN (ref 70–130)
EOSINOPHIL # BLD AUTO: 0 10X3/UL (ref 0–0.5)
EOSINOPHIL NFR BLD AUTO: 0 % (ref 0–6)
GLUCOSE SERPL-MCNC: 115 MG/DL (ref 83–110)
HCT VFR BLD CALC: 39.7 % (ref 38.8–50)
HGB BLD-MCNC: 13.3 G/DL (ref 13.5–17.5)
LYMPHOCYTES NFR BLD AUTO: 5.5 % (ref 18–47)
MAGNESIUM SERPL-MCNC: 2 MG/DL (ref 1.6–2.6)
MCH RBC QN AUTO: 29.8 PG (ref 27–33)
MCV RBC AUTO: 89 FL (ref 81.2–95.1)
MONOCYTES # BLD AUTO: 0.9 10X3/UL (ref 0–1.1)
MONOCYTES NFR BLD AUTO: 8.3 % (ref 0–10)
NEUTROPHILS # BLD AUTO: 8.9 10X3/UL (ref 1.5–8.4)
NEUTROPHILS NFR BLD AUTO: 85.6 % (ref 40–75)
PLATELET # BLD AUTO: 181 10X3/UL (ref 150–450)
POTASSIUM SERPL-SCNC: 4 MMOL/L (ref 3.5–5.1)
RBC # BLD AUTO: 4.46 10X6/UL (ref 4.32–5.72)
SODIUM SERPL-SCNC: 140 MMOL/L (ref 136–145)
WBC # BLD AUTO: 10.4 10X3/UL (ref 3.5–10.5)

## 2023-09-14 RX ADMIN — ALBUTEROL SULFATE SCH PUFF: 90 AEROSOL, METERED RESPIRATORY (INHALATION) at 07:30

## 2023-09-14 RX ADMIN — ALBUTEROL SULFATE SCH PUFF: 90 AEROSOL, METERED RESPIRATORY (INHALATION) at 19:30

## 2023-09-14 RX ADMIN — AZITHROMYCIN SCH MLS: 500 INJECTION, POWDER, LYOPHILIZED, FOR SOLUTION INTRAVENOUS at 05:02

## 2023-09-14 RX ADMIN — ALBUTEROL SULFATE SCH PUFF: 90 AEROSOL, METERED RESPIRATORY (INHALATION) at 01:30

## 2023-09-14 RX ADMIN — SACUBITRIL AND VALSARTAN SCH TAB: 24; 26 TABLET, FILM COATED ORAL at 20:48

## 2023-09-14 RX ADMIN — SACUBITRIL AND VALSARTAN SCH TAB: 24; 26 TABLET, FILM COATED ORAL at 11:21

## 2023-09-14 RX ADMIN — NIRMATRELVIR AND RITONAVIR SCH EACH: KIT at 20:48

## 2023-09-14 RX ADMIN — NIRMATRELVIR AND RITONAVIR SCH TAB: KIT at 09:20

## 2023-09-14 RX ADMIN — ALBUTEROL SULFATE SCH PUFF: 90 AEROSOL, METERED RESPIRATORY (INHALATION) at 13:30

## 2023-09-14 RX ADMIN — ASPIRIN SCH MG: 81 TABLET ORAL at 09:13

## 2023-09-15 VITALS — TEMPERATURE: 98.4 F

## 2023-09-15 VITALS — SYSTOLIC BLOOD PRESSURE: 119 MMHG | DIASTOLIC BLOOD PRESSURE: 85 MMHG

## 2023-09-15 LAB
ANION GAP SERPL CALC-SCNC: 14 MMOL/L (ref 10–20)
BASOPHILS # BLD AUTO: 0 10X3/UL (ref 0–0.2)
BASOPHILS NFR BLD AUTO: 0.1 % (ref 0–2)
BUN SERPL-MCNC: 30 MG/DL (ref 8.4–25.7)
CALCIUM SERPL-MCNC: 8.3 MG/DL (ref 7.8–10.44)
CHLORIDE SERPL-SCNC: 96 MMOL/L (ref 98–107)
CO2 SERPL-SCNC: 29 MMOL/L (ref 23–31)
CREAT CL PREDICTED SERPL C-G-VRATE: 56 ML/MIN (ref 70–130)
EOSINOPHIL # BLD AUTO: 0 10X3/UL (ref 0–0.5)
EOSINOPHIL NFR BLD AUTO: 0 % (ref 0–6)
GLUCOSE SERPL-MCNC: 145 MG/DL (ref 83–110)
HCT VFR BLD CALC: 43.8 % (ref 38.8–50)
HGB BLD-MCNC: 14.9 G/DL (ref 13.5–17.5)
LYMPHOCYTES NFR BLD AUTO: 7.4 % (ref 18–47)
MAGNESIUM SERPL-MCNC: 2.4 MG/DL (ref 1.6–2.6)
MCH RBC QN AUTO: 29.9 PG (ref 27–33)
MCV RBC AUTO: 88 FL (ref 81.2–95.1)
MONOCYTES # BLD AUTO: 0.9 10X3/UL (ref 0–1.1)
MONOCYTES NFR BLD AUTO: 9.5 % (ref 0–10)
NEUTROPHILS # BLD AUTO: 8.1 10X3/UL (ref 1.5–8.4)
NEUTROPHILS NFR BLD AUTO: 82.6 % (ref 40–75)
PLATELET # BLD AUTO: 227 10X3/UL (ref 150–450)
POTASSIUM SERPL-SCNC: 3.9 MMOL/L (ref 3.5–5.1)
RBC # BLD AUTO: 4.98 10X6/UL (ref 4.32–5.72)
SODIUM SERPL-SCNC: 135 MMOL/L (ref 136–145)
WBC # BLD AUTO: 9.8 10X3/UL (ref 3.5–10.5)

## 2023-09-15 RX ADMIN — AZITHROMYCIN SCH MLS: 500 INJECTION, POWDER, LYOPHILIZED, FOR SOLUTION INTRAVENOUS at 05:07

## 2023-09-15 RX ADMIN — ASPIRIN SCH MG: 81 TABLET ORAL at 08:04

## 2023-09-15 RX ADMIN — ALBUTEROL SULFATE SCH PUFF: 90 AEROSOL, METERED RESPIRATORY (INHALATION) at 07:00

## 2023-09-15 RX ADMIN — SACUBITRIL AND VALSARTAN SCH TAB: 24; 26 TABLET, FILM COATED ORAL at 08:20

## 2023-09-15 RX ADMIN — ALBUTEROL SULFATE SCH PUFF: 90 AEROSOL, METERED RESPIRATORY (INHALATION) at 00:30

## 2023-09-15 RX ADMIN — NIRMATRELVIR AND RITONAVIR SCH EACH: KIT at 08:11

## 2024-10-24 ENCOUNTER — HOSPITAL ENCOUNTER (EMERGENCY)
Dept: HOSPITAL 92 - CSHERS | Age: 79
End: 2024-10-24
Payer: MEDICARE

## 2024-10-24 DIAGNOSIS — R29.898: Primary | ICD-10-CM

## 2024-10-24 DIAGNOSIS — F17.210: ICD-10-CM

## 2024-10-24 LAB
ALBUMIN SERPL BCG-MCNC: 3.5 G/DL (ref 3.4–4.8)
ALP SERPL-CCNC: 63 U/L (ref 40–110)
ALT SERPL W P-5'-P-CCNC: 17 U/L (ref 8–55)
ANION GAP SERPL CALC-SCNC: 12 MMOL/L (ref 10–20)
AST SERPL-CCNC: 25 U/L (ref 5–34)
BACTERIA UR QL AUTO: (no result) HPF
BASOPHILS # BLD AUTO: 0.04 10X3/UL (ref 0–0.2)
BASOPHILS NFR BLD AUTO: 0.7 % (ref 0–2)
BILIRUB SERPL-MCNC: 0.6 MG/DL (ref 0.2–1.2)
BUN SERPL-MCNC: 11 MG/DL (ref 8.4–25.7)
CALCIUM SERPL-MCNC: 9.3 MG/DL (ref 7.8–10.44)
CAUTI INDICATIONS FOR CULTURE: (no result)
CHLORIDE SERPL-SCNC: 95 MMOL/L (ref 98–107)
CO2 SERPL-SCNC: 32 MMOL/L (ref 23–31)
CREAT CL PREDICTED SERPL C-G-VRATE: 0 ML/MIN (ref 70–130)
EOSINOPHIL # BLD AUTO: 0.3 10X3/UL (ref 0–0.5)
EOSINOPHIL NFR BLD AUTO: 5.3 % (ref 0–6)
GLOBULIN SER CALC-MCNC: 2.7 G/DL (ref 2.4–3.5)
GLUCOSE SERPL-MCNC: 121 MG/DL (ref 83–110)
HCT VFR BLD CALC: 37 % (ref 38.8–50)
HGB BLD-MCNC: 11.8 G/DL (ref 13.5–17.5)
LYMPHOCYTES NFR BLD AUTO: 19.4 % (ref 18–47)
MAGNESIUM SERPL-MCNC: 1.8 MG/DL (ref 1.6–2.6)
MCH RBC QN AUTO: 29.7 PG (ref 27–33)
MCV RBC AUTO: 93.2 FL (ref 81.2–95.1)
MONOCYTES # BLD AUTO: 0.62 10X3/UL (ref 0–1.1)
MONOCYTES NFR BLD AUTO: 11 % (ref 0–10)
NEUTROPHILS # BLD AUTO: 3.59 10X3/UL (ref 1.5–8.4)
NEUTROPHILS NFR BLD AUTO: 63.4 % (ref 40–75)
PLATELET # BLD AUTO: 177 10X3/UL (ref 150–450)
POTASSIUM SERPL-SCNC: 4.1 MMOL/L (ref 3.5–5.1)
RBC # BLD AUTO: 3.97 10X6/UL (ref 4.32–5.72)
RBC UR QL AUTO: (no result) HPF (ref 0–3)
SODIUM SERPL-SCNC: 135 MMOL/L (ref 136–145)
SP GR UR STRIP: 1.01 (ref 1–1.03)
TROPONIN I SERPL DL<=0.01 NG/ML-MCNC: 0.02 NG/ML (ref ?–0.03)
TROPONIN I SERPL DL<=0.01 NG/ML-MCNC: 0.03 NG/ML (ref ?–0.03)
WBC # BLD AUTO: 5.7 10X3/UL (ref 3.5–10.5)

## 2024-10-24 PROCEDURE — 85025 COMPLETE CBC W/AUTO DIFF WBC: CPT

## 2024-10-24 PROCEDURE — 83735 ASSAY OF MAGNESIUM: CPT

## 2024-10-24 PROCEDURE — 36415 COLL VENOUS BLD VENIPUNCTURE: CPT

## 2024-10-24 PROCEDURE — 84484 ASSAY OF TROPONIN QUANT: CPT

## 2024-10-24 PROCEDURE — 93005 ELECTROCARDIOGRAM TRACING: CPT

## 2024-10-24 PROCEDURE — 99284 EMERGENCY DEPT VISIT MOD MDM: CPT

## 2024-10-24 PROCEDURE — 80053 COMPREHEN METABOLIC PANEL: CPT

## 2024-10-24 PROCEDURE — 81001 URINALYSIS AUTO W/SCOPE: CPT
